# Patient Record
Sex: FEMALE | Race: WHITE | NOT HISPANIC OR LATINO | Employment: FULL TIME | URBAN - METROPOLITAN AREA
[De-identification: names, ages, dates, MRNs, and addresses within clinical notes are randomized per-mention and may not be internally consistent; named-entity substitution may affect disease eponyms.]

---

## 2023-02-27 LAB
ALANINE AMINOTRANSFERASE (SGPT) (U/L) IN SER/PLAS: 18 U/L (ref 7–45)
ALBUMIN (G/DL) IN SER/PLAS: 4.5 G/DL (ref 3.4–5)
ALKALINE PHOSPHATASE (U/L) IN SER/PLAS: 68 U/L (ref 33–110)
ANION GAP IN SER/PLAS: 15 MMOL/L (ref 10–20)
ASPARTATE AMINOTRANSFERASE (SGOT) (U/L) IN SER/PLAS: 16 U/L (ref 9–39)
BASOPHILS (10*3/UL) IN BLOOD BY AUTOMATED COUNT: 0.04 X10E9/L (ref 0–0.1)
BASOPHILS/100 LEUKOCYTES IN BLOOD BY AUTOMATED COUNT: 0.3 % (ref 0–2)
BILIRUBIN TOTAL (MG/DL) IN SER/PLAS: 0.5 MG/DL (ref 0–1.2)
CALCIUM (MG/DL) IN SER/PLAS: 9.7 MG/DL (ref 8.6–10.6)
CARBON DIOXIDE, TOTAL (MMOL/L) IN SER/PLAS: 24 MMOL/L (ref 21–32)
CHLORIDE (MMOL/L) IN SER/PLAS: 106 MMOL/L (ref 98–107)
CREATININE (MG/DL) IN SER/PLAS: 0.63 MG/DL (ref 0.5–1.05)
EOSINOPHILS (10*3/UL) IN BLOOD BY AUTOMATED COUNT: 0.1 X10E9/L (ref 0–0.7)
EOSINOPHILS/100 LEUKOCYTES IN BLOOD BY AUTOMATED COUNT: 0.7 % (ref 0–6)
ERYTHROCYTE DISTRIBUTION WIDTH (RATIO) BY AUTOMATED COUNT: 12.7 % (ref 11.5–14.5)
ERYTHROCYTE MEAN CORPUSCULAR HEMOGLOBIN CONCENTRATION (G/DL) BY AUTOMATED: 33.7 G/DL (ref 32–36)
ERYTHROCYTE MEAN CORPUSCULAR VOLUME (FL) BY AUTOMATED COUNT: 87 FL (ref 80–100)
ERYTHROCYTES (10*6/UL) IN BLOOD BY AUTOMATED COUNT: 4.95 X10E12/L (ref 4–5.2)
GFR FEMALE: >90 ML/MIN/1.73M2
GLUCOSE (MG/DL) IN SER/PLAS: 62 MG/DL (ref 74–99)
HEMATOCRIT (%) IN BLOOD BY AUTOMATED COUNT: 43 % (ref 36–46)
HEMOGLOBIN (G/DL) IN BLOOD: 14.5 G/DL (ref 12–16)
IMMATURE GRANULOCYTES/100 LEUKOCYTES IN BLOOD BY AUTOMATED COUNT: 0.5 % (ref 0–0.9)
LEUKOCYTES (10*3/UL) IN BLOOD BY AUTOMATED COUNT: 14.9 X10E9/L (ref 4.4–11.3)
LYMPHOCYTES (10*3/UL) IN BLOOD BY AUTOMATED COUNT: 2.25 X10E9/L (ref 1.2–4.8)
LYMPHOCYTES/100 LEUKOCYTES IN BLOOD BY AUTOMATED COUNT: 15.2 % (ref 13–44)
MONOCYTES (10*3/UL) IN BLOOD BY AUTOMATED COUNT: 0.68 X10E9/L (ref 0.1–1)
MONOCYTES/100 LEUKOCYTES IN BLOOD BY AUTOMATED COUNT: 4.6 % (ref 2–10)
NEUTROPHILS (10*3/UL) IN BLOOD BY AUTOMATED COUNT: 11.71 X10E9/L (ref 1.2–7.7)
NEUTROPHILS/100 LEUKOCYTES IN BLOOD BY AUTOMATED COUNT: 78.7 % (ref 40–80)
NRBC (PER 100 WBCS) BY AUTOMATED COUNT: 0 /100 WBC (ref 0–0)
PLATELETS (10*3/UL) IN BLOOD AUTOMATED COUNT: 312 X10E9/L (ref 150–450)
POTASSIUM (MMOL/L) IN SER/PLAS: 4.1 MMOL/L (ref 3.5–5.3)
PROTEIN TOTAL: 7 G/DL (ref 6.4–8.2)
SODIUM (MMOL/L) IN SER/PLAS: 141 MMOL/L (ref 136–145)
UREA NITROGEN (MG/DL) IN SER/PLAS: 13 MG/DL (ref 6–23)

## 2023-03-05 LAB — URINE CULTURE: NORMAL

## 2023-03-06 DIAGNOSIS — N39.0 URINARY TRACT INFECTION WITHOUT HEMATURIA, SITE UNSPECIFIED: Primary | ICD-10-CM

## 2023-03-08 LAB — URINE CULTURE: NORMAL

## 2023-05-05 ENCOUNTER — TELEPHONE (OUTPATIENT)
Dept: PRIMARY CARE | Facility: CLINIC | Age: 43
End: 2023-05-05
Payer: COMMERCIAL

## 2023-05-05 NOTE — TELEPHONE ENCOUNTER
Pt called & talked me thru what she needed her paperwork to say. Completed & will make a copy & give original to pt.

## 2023-06-14 ENCOUNTER — TELEPHONE (OUTPATIENT)
Dept: PRIMARY CARE | Facility: CLINIC | Age: 43
End: 2023-06-14
Payer: COMMERCIAL

## 2023-07-13 ENCOUNTER — OFFICE VISIT (OUTPATIENT)
Dept: PRIMARY CARE | Facility: CLINIC | Age: 43
End: 2023-07-13
Payer: COMMERCIAL

## 2023-07-13 ENCOUNTER — LAB (OUTPATIENT)
Dept: LAB | Facility: LAB | Age: 43
End: 2023-07-13
Payer: COMMERCIAL

## 2023-07-13 VITALS
HEART RATE: 84 BPM | DIASTOLIC BLOOD PRESSURE: 98 MMHG | BODY MASS INDEX: 38.94 KG/M2 | SYSTOLIC BLOOD PRESSURE: 142 MMHG | WEIGHT: 234 LBS

## 2023-07-13 DIAGNOSIS — E66.01 CLASS 2 SEVERE OBESITY DUE TO EXCESS CALORIES WITH SERIOUS COMORBIDITY AND BODY MASS INDEX (BMI) OF 38.0 TO 38.9 IN ADULT (MULTI): ICD-10-CM

## 2023-07-13 DIAGNOSIS — N39.0 URINARY TRACT INFECTION WITHOUT HEMATURIA, SITE UNSPECIFIED: ICD-10-CM

## 2023-07-13 DIAGNOSIS — K63.5 POLYP OF COLON, UNSPECIFIED PART OF COLON, UNSPECIFIED TYPE: ICD-10-CM

## 2023-07-13 DIAGNOSIS — I10 HTN (HYPERTENSION), BENIGN: ICD-10-CM

## 2023-07-13 DIAGNOSIS — N39.46 MIXED STRESS AND URGE URINARY INCONTINENCE: Primary | ICD-10-CM

## 2023-07-13 DIAGNOSIS — Z12.11 ENCOUNTER FOR SCREENING FOR COLORECTAL MALIGNANT NEOPLASM: ICD-10-CM

## 2023-07-13 DIAGNOSIS — L50.9 HIVES: ICD-10-CM

## 2023-07-13 DIAGNOSIS — M54.12 CERVICAL RADICULAR PAIN: ICD-10-CM

## 2023-07-13 DIAGNOSIS — Z00.00 ENCOUNTER FOR ANNUAL GENERAL MEDICAL EXAMINATION WITHOUT ABNORMAL FINDINGS IN ADULT: ICD-10-CM

## 2023-07-13 DIAGNOSIS — L57.0 KERATOSIS: ICD-10-CM

## 2023-07-13 DIAGNOSIS — Z12.12 ENCOUNTER FOR SCREENING FOR COLORECTAL MALIGNANT NEOPLASM: ICD-10-CM

## 2023-07-13 DIAGNOSIS — L73.9 FOLLICULITIS: ICD-10-CM

## 2023-07-13 DIAGNOSIS — Z12.31 ENCOUNTER FOR SCREENING MAMMOGRAM FOR MALIGNANT NEOPLASM OF BREAST: ICD-10-CM

## 2023-07-13 PROBLEM — Z98.890 HISTORY OF CONE BIOPSY OF CERVIX: Status: RESOLVED | Noted: 2019-02-08 | Resolved: 2023-07-13

## 2023-07-13 PROBLEM — E55.9 VITAMIN D DEFICIENCY: Status: ACTIVE | Noted: 2023-07-13

## 2023-07-13 PROBLEM — R29.818 SUSPECTED SLEEP APNEA: Status: RESOLVED | Noted: 2023-07-13 | Resolved: 2023-07-13

## 2023-07-13 PROBLEM — K21.9 GASTROESOPHAGEAL REFLUX DISEASE: Status: ACTIVE | Noted: 2023-07-13

## 2023-07-13 PROBLEM — J45.909 AB (ASTHMATIC BRONCHITIS) (HHS-HCC): Status: ACTIVE | Noted: 2023-07-13

## 2023-07-13 PROBLEM — G47.00 INSOMNIA: Status: ACTIVE | Noted: 2023-07-13

## 2023-07-13 PROBLEM — F41.1 GENERALIZED ANXIETY DISORDER: Status: ACTIVE | Noted: 2023-07-13

## 2023-07-13 PROCEDURE — 3080F DIAST BP >= 90 MM HG: CPT | Performed by: FAMILY MEDICINE

## 2023-07-13 PROCEDURE — 87086 URINE CULTURE/COLONY COUNT: CPT

## 2023-07-13 PROCEDURE — 3008F BODY MASS INDEX DOCD: CPT | Performed by: FAMILY MEDICINE

## 2023-07-13 PROCEDURE — 99215 OFFICE O/P EST HI 40 MIN: CPT | Performed by: FAMILY MEDICINE

## 2023-07-13 PROCEDURE — 3077F SYST BP >= 140 MM HG: CPT | Performed by: FAMILY MEDICINE

## 2023-07-13 PROCEDURE — 1036F TOBACCO NON-USER: CPT | Performed by: FAMILY MEDICINE

## 2023-07-13 RX ORDER — PREDNISOLONE ACETATE 10 MG/ML
SUSPENSION/ DROPS OPHTHALMIC
COMMUNITY
Start: 2022-12-10 | End: 2023-10-16 | Stop reason: ALTCHOICE

## 2023-07-13 RX ORDER — LEVONORGESTREL 52 MG/1
INTRAUTERINE DEVICE INTRAUTERINE
COMMUNITY
Start: 2021-02-08

## 2023-07-13 RX ORDER — LORATADINE 10 MG/1
1 TABLET ORAL DAILY PRN
COMMUNITY
Start: 2021-11-19 | End: 2023-10-16 | Stop reason: ALTCHOICE

## 2023-07-13 RX ORDER — UREA 40 %
CREAM (GRAM) TOPICAL
COMMUNITY
Start: 2023-03-06 | End: 2023-10-16 | Stop reason: ALTCHOICE

## 2023-07-13 RX ORDER — ATENOLOL 25 MG/1
25 TABLET ORAL DAILY
Qty: 90 TABLET | Refills: 0 | Status: SHIPPED | OUTPATIENT
Start: 2023-07-13 | End: 2023-10-09 | Stop reason: SDUPTHER

## 2023-07-13 RX ORDER — CLOTRIMAZOLE 1 %
CREAM (GRAM) TOPICAL 2 TIMES DAILY
COMMUNITY
Start: 2022-07-20 | End: 2023-10-16 | Stop reason: ALTCHOICE

## 2023-07-13 RX ORDER — ESCITALOPRAM OXALATE 5 MG/1
1 TABLET ORAL DAILY
COMMUNITY
Start: 2022-09-23 | End: 2023-10-16 | Stop reason: ALTCHOICE

## 2023-07-13 RX ORDER — MONTELUKAST SODIUM 10 MG/1
1 TABLET ORAL NIGHTLY
COMMUNITY
Start: 2020-03-20 | End: 2023-10-16 | Stop reason: ALTCHOICE

## 2023-07-13 RX ORDER — ALBUTEROL SULFATE 90 UG/1
2 AEROSOL, METERED RESPIRATORY (INHALATION) EVERY 4 HOURS PRN
COMMUNITY
Start: 2020-03-20 | End: 2023-10-16 | Stop reason: ALTCHOICE

## 2023-07-13 RX ORDER — GABAPENTIN 100 MG/1
100 CAPSULE ORAL NIGHTLY
Qty: 90 CAPSULE | Refills: 0 | Status: SHIPPED | OUTPATIENT
Start: 2023-07-13 | End: 2023-10-16 | Stop reason: ALTCHOICE

## 2023-07-13 RX ORDER — TRIAMCINOLONE ACETONIDE 5 MG/G
CREAM TOPICAL
COMMUNITY
Start: 2020-08-20 | End: 2023-10-16 | Stop reason: ALTCHOICE

## 2023-07-13 RX ORDER — TRAZODONE HYDROCHLORIDE 50 MG/1
50 TABLET ORAL NIGHTLY PRN
COMMUNITY
End: 2023-10-16

## 2023-07-13 RX ORDER — RUXOLITINIB 15 MG/G
CREAM TOPICAL
COMMUNITY
Start: 2023-05-30 | End: 2023-10-16 | Stop reason: ALTCHOICE

## 2023-07-13 NOTE — PROGRESS NOTES
Subjective   Patient ID: Deja Hazel is a 43 y.o. female who presents for Follow-up.  Today she is accompanied by alone.     HPI  Needs forms filled for standing and sitting accommodations due to pain in lower back and numbness of both legs.   She  would like to be able to sit down at work when she is at work.  Needs lumbar support pillow and mini Theragun rx  Would like the vaginal dilators and pelvic floor massage tool    Has a lot of stress and increased pain      Review of Systems   Constitutional:  Negative for activity change, appetite change, chills, diaphoresis, fatigue, fever and unexpected weight change.   HENT:  Negative for congestion, dental problem, drooling, ear discharge, ear pain, facial swelling, hearing loss, nosebleeds, postnasal drip, rhinorrhea, sinus pressure, sinus pain, sneezing, sore throat, tinnitus, trouble swallowing and voice change.    Eyes:  Negative for pain, discharge, redness, itching and visual disturbance.   Respiratory:  Negative for cough, chest tightness, shortness of breath and wheezing.    Cardiovascular:  Negative for chest pain, palpitations and leg swelling.   Gastrointestinal:  Negative for abdominal pain, blood in stool, constipation, diarrhea, nausea and vomiting.   Endocrine: Negative for cold intolerance, heat intolerance, polydipsia, polyphagia and polyuria.   Genitourinary:  Negative for decreased urine volume, dysuria, flank pain, frequency, hematuria and urgency.   Musculoskeletal:  Negative for arthralgias, back pain, gait problem, joint swelling, myalgias and neck stiffness.   Skin:  Negative for color change, pallor, rash and wound.   Neurological:  Negative for dizziness.   Hematological:  Negative for adenopathy. Does not bruise/bleed easily.   Psychiatric/Behavioral:  Negative for agitation, behavioral problems and sleep disturbance. The patient is not nervous/anxious.        Objective   Blood Pressure (Abnormal) 142/98 (BP Location: Left arm)   Pulse  84   Weight 106 kg (234 lb)   Body Mass Index 38.94 kg/m²   BSA: 2.2 meters squared  Growth percentiles: Facility age limit for growth %nandini is 20 years. Facility age limit for growth %nandini is 20 years.     Physical Exam  Vitals and nursing note reviewed.   Constitutional:       General: She is not in acute distress.     Appearance: Normal appearance. She is normal weight. She is not ill-appearing.   HENT:      Head: Normocephalic.      Right Ear: Tympanic membrane, ear canal and external ear normal.      Left Ear: Tympanic membrane, ear canal and external ear normal.      Nose: Nose normal. No rhinorrhea.      Mouth/Throat:      Mouth: Mucous membranes are moist.      Pharynx: Oropharynx is clear.   Eyes:      Extraocular Movements: Extraocular movements intact.      Conjunctiva/sclera: Conjunctivae normal.      Pupils: Pupils are equal, round, and reactive to light.   Cardiovascular:      Rate and Rhythm: Normal rate and regular rhythm.      Pulses: Normal pulses.      Heart sounds: Normal heart sounds.   Pulmonary:      Effort: Pulmonary effort is normal. No respiratory distress.      Breath sounds: Normal breath sounds. No wheezing.   Abdominal:      General: Abdomen is flat. Bowel sounds are normal.      Palpations: Abdomen is soft.      Tenderness: There is no abdominal tenderness. There is no guarding or rebound.      Hernia: No hernia is present.   Musculoskeletal:         General: Normal range of motion.      Cervical back: Normal range of motion and neck supple. No rigidity or tenderness.      Right lower leg: No edema.      Left lower leg: No edema.   Lymphadenopathy:      Cervical: No cervical adenopathy.   Skin:     General: Skin is warm and dry.      Capillary Refill: Capillary refill takes more than 3 seconds.   Neurological:      General: No focal deficit present.      Mental Status: She is alert and oriented to person, place, and time.      Sensory: No sensory deficit.      Motor: No weakness.       Coordination: Coordination normal.   Psychiatric:         Mood and Affect: Mood normal.         Behavior: Behavior normal.         Thought Content: Thought content normal.         Judgment: Judgment normal.         Assessment/Plan   Problem List Items Addressed This Visit    None  Visit Diagnoses       Mixed stress and urge urinary incontinence    -  Primary    Relevant Orders    TSH with reflex to Free T4 if abnormal (Completed)    Cervical radicular pain        Relevant Medications    gabapentin (Neurontin) 100 mg capsule    Other Relevant Orders    TSH with reflex to Free T4 if abnormal (Completed)    Keratosis        Relevant Orders    TSH with reflex to Free T4 if abnormal (Completed)    Folliculitis        Relevant Orders    C-Reactive Protein (Completed)    TSH with reflex to Free T4 if abnormal (Completed)    Encounter for annual general medical examination without abnormal findings in adult        Relevant Orders    Comprehensive Metabolic Panel (Completed)    CBC and Auto Differential (Completed)    HIV 1/2 Antigen/Antibody Screen with Reflex to Confirmation (Completed)    Hepatitis C Antibody (Completed)    Encounter for screening mammogram for malignant neoplasm of breast        Relevant Orders    BI mammo bilateral screening tomosynthesis (Completed)    Encounter for screening for colorectal malignant neoplasm        Relevant Orders    POCT Fecal occult immunoassay-ifob manually resulted    Class 2 severe obesity due to excess calories with serious comorbidity and body mass index (BMI) of 38.0 to 38.9 in adult (CMS/[facility])        Relevant Orders    Insulin, Random (Completed)    Polyp of colon, unspecified part of colon, unspecified type        Relevant Orders    Referral to Gastroenterology    HTN (hypertension), benign        Relevant Medications    atenolol (Tenormin) 25 mg tablet    Hives        Relevant Orders    Hemoglobin A1C (Completed)        Will send in all the paperwork and orders that she  needs for her HSA to cover.   F/U for HTN for elevated BP

## 2023-07-14 ENCOUNTER — LAB (OUTPATIENT)
Dept: LAB | Facility: LAB | Age: 43
End: 2023-07-14
Payer: COMMERCIAL

## 2023-07-14 DIAGNOSIS — L50.9 HIVES: ICD-10-CM

## 2023-07-14 DIAGNOSIS — N39.46 MIXED STRESS AND URGE URINARY INCONTINENCE: ICD-10-CM

## 2023-07-14 DIAGNOSIS — L73.9 FOLLICULITIS: ICD-10-CM

## 2023-07-14 DIAGNOSIS — L57.0 KERATOSIS: ICD-10-CM

## 2023-07-14 DIAGNOSIS — M54.12 CERVICAL RADICULAR PAIN: ICD-10-CM

## 2023-07-14 DIAGNOSIS — E66.01 CLASS 2 SEVERE OBESITY DUE TO EXCESS CALORIES WITH SERIOUS COMORBIDITY AND BODY MASS INDEX (BMI) OF 38.0 TO 38.9 IN ADULT (MULTI): ICD-10-CM

## 2023-07-14 DIAGNOSIS — Z00.00 ENCOUNTER FOR ANNUAL GENERAL MEDICAL EXAMINATION WITHOUT ABNORMAL FINDINGS IN ADULT: ICD-10-CM

## 2023-07-14 LAB
ALANINE AMINOTRANSFERASE (SGPT) (U/L) IN SER/PLAS: 19 U/L (ref 7–45)
ALBUMIN (G/DL) IN SER/PLAS: 4.5 G/DL (ref 3.4–5)
ALKALINE PHOSPHATASE (U/L) IN SER/PLAS: 74 U/L (ref 33–110)
ANION GAP IN SER/PLAS: 11 MMOL/L (ref 10–20)
ASPARTATE AMINOTRANSFERASE (SGOT) (U/L) IN SER/PLAS: 14 U/L (ref 9–39)
BASOPHILS (10*3/UL) IN BLOOD BY AUTOMATED COUNT: 0.04 X10E9/L (ref 0–0.1)
BASOPHILS/100 LEUKOCYTES IN BLOOD BY AUTOMATED COUNT: 0.4 % (ref 0–2)
BILIRUBIN TOTAL (MG/DL) IN SER/PLAS: 0.6 MG/DL (ref 0–1.2)
C REACTIVE PROTEIN (MG/L) IN SER/PLAS: 1.06 MG/DL
CALCIUM (MG/DL) IN SER/PLAS: 9.3 MG/DL (ref 8.6–10.6)
CARBON DIOXIDE, TOTAL (MMOL/L) IN SER/PLAS: 26 MMOL/L (ref 21–32)
CHLORIDE (MMOL/L) IN SER/PLAS: 106 MMOL/L (ref 98–107)
CREATININE (MG/DL) IN SER/PLAS: 0.65 MG/DL (ref 0.5–1.05)
EOSINOPHILS (10*3/UL) IN BLOOD BY AUTOMATED COUNT: 0.18 X10E9/L (ref 0–0.7)
EOSINOPHILS/100 LEUKOCYTES IN BLOOD BY AUTOMATED COUNT: 1.8 % (ref 0–6)
ERYTHROCYTE DISTRIBUTION WIDTH (RATIO) BY AUTOMATED COUNT: 12.6 % (ref 11.5–14.5)
ERYTHROCYTE MEAN CORPUSCULAR HEMOGLOBIN CONCENTRATION (G/DL) BY AUTOMATED: 32.7 G/DL (ref 32–36)
ERYTHROCYTE MEAN CORPUSCULAR VOLUME (FL) BY AUTOMATED COUNT: 88 FL (ref 80–100)
ERYTHROCYTES (10*6/UL) IN BLOOD BY AUTOMATED COUNT: 5.23 X10E12/L (ref 4–5.2)
ESTIMATED AVERAGE GLUCOSE FOR HBA1C: 111 MG/DL
GFR FEMALE: >90 ML/MIN/1.73M2
GLUCOSE (MG/DL) IN SER/PLAS: 105 MG/DL (ref 74–99)
HEMATOCRIT (%) IN BLOOD BY AUTOMATED COUNT: 46.2 % (ref 36–46)
HEMOGLOBIN (G/DL) IN BLOOD: 15.1 G/DL (ref 12–16)
HEMOGLOBIN A1C/HEMOGLOBIN TOTAL IN BLOOD: 5.5 %
HEPATITIS C VIRUS AB PRESENCE IN SERUM: NONREACTIVE
HIV 1/ 2 AG/AB SCREEN: NONREACTIVE
IMMATURE GRANULOCYTES/100 LEUKOCYTES IN BLOOD BY AUTOMATED COUNT: 0.5 % (ref 0–0.9)
INSULIN: 19 UIU/ML (ref 3–25)
LEUKOCYTES (10*3/UL) IN BLOOD BY AUTOMATED COUNT: 10.2 X10E9/L (ref 4.4–11.3)
LYMPHOCYTES (10*3/UL) IN BLOOD BY AUTOMATED COUNT: 2.07 X10E9/L (ref 1.2–4.8)
LYMPHOCYTES/100 LEUKOCYTES IN BLOOD BY AUTOMATED COUNT: 20.3 % (ref 13–44)
MONOCYTES (10*3/UL) IN BLOOD BY AUTOMATED COUNT: 0.67 X10E9/L (ref 0.1–1)
MONOCYTES/100 LEUKOCYTES IN BLOOD BY AUTOMATED COUNT: 6.6 % (ref 2–10)
NEUTROPHILS (10*3/UL) IN BLOOD BY AUTOMATED COUNT: 7.2 X10E9/L (ref 1.2–7.7)
NEUTROPHILS/100 LEUKOCYTES IN BLOOD BY AUTOMATED COUNT: 70.4 % (ref 40–80)
NRBC (PER 100 WBCS) BY AUTOMATED COUNT: 0 /100 WBC (ref 0–0)
PLATELETS (10*3/UL) IN BLOOD AUTOMATED COUNT: 310 X10E9/L (ref 150–450)
POTASSIUM (MMOL/L) IN SER/PLAS: 4.2 MMOL/L (ref 3.5–5.3)
PROTEIN TOTAL: 7.1 G/DL (ref 6.4–8.2)
SODIUM (MMOL/L) IN SER/PLAS: 139 MMOL/L (ref 136–145)
THYROTROPIN (MIU/L) IN SER/PLAS BY DETECTION LIMIT <= 0.05 MIU/L: 1.91 MIU/L (ref 0.44–3.98)
UREA NITROGEN (MG/DL) IN SER/PLAS: 16 MG/DL (ref 6–23)

## 2023-07-14 PROCEDURE — 36415 COLL VENOUS BLD VENIPUNCTURE: CPT

## 2023-07-14 PROCEDURE — 84443 ASSAY THYROID STIM HORMONE: CPT

## 2023-07-14 PROCEDURE — 83036 HEMOGLOBIN GLYCOSYLATED A1C: CPT

## 2023-07-14 PROCEDURE — 86140 C-REACTIVE PROTEIN: CPT

## 2023-07-14 PROCEDURE — 86803 HEPATITIS C AB TEST: CPT

## 2023-07-14 PROCEDURE — 87389 HIV-1 AG W/HIV-1&-2 AB AG IA: CPT

## 2023-07-14 PROCEDURE — 85025 COMPLETE CBC W/AUTO DIFF WBC: CPT

## 2023-07-14 PROCEDURE — 83525 ASSAY OF INSULIN: CPT

## 2023-07-14 PROCEDURE — 80053 COMPREHEN METABOLIC PANEL: CPT

## 2023-07-15 LAB — URINE CULTURE: NORMAL

## 2023-07-19 ASSESSMENT — ENCOUNTER SYMPTOMS
CONSTIPATION: 0
EYE PAIN: 0
SINUS PRESSURE: 0
COLOR CHANGE: 0
ABDOMINAL PAIN: 0
TROUBLE SWALLOWING: 0
DIAPHORESIS: 0
SORE THROAT: 0
DYSURIA: 0
VOICE CHANGE: 0
POLYPHAGIA: 0
CHILLS: 0
AGITATION: 0
FREQUENCY: 0
JOINT SWELLING: 0
COUGH: 0
UNEXPECTED WEIGHT CHANGE: 0
FEVER: 0
ADENOPATHY: 0
CHEST TIGHTNESS: 0
BLOOD IN STOOL: 0
PALPITATIONS: 0
ACTIVITY CHANGE: 0
SHORTNESS OF BREATH: 0
FACIAL SWELLING: 0
DIZZINESS: 0
RHINORRHEA: 0
NECK STIFFNESS: 0
EYE DISCHARGE: 0
MYALGIAS: 0
FLANK PAIN: 0
APPETITE CHANGE: 0
SLEEP DISTURBANCE: 0
NERVOUS/ANXIOUS: 0
EYE REDNESS: 0
SINUS PAIN: 0
DIARRHEA: 0
NAUSEA: 0
ARTHRALGIAS: 0
WOUND: 0
WHEEZING: 0
FATIGUE: 0
HEMATURIA: 0
POLYDIPSIA: 0
BACK PAIN: 0
VOMITING: 0
BRUISES/BLEEDS EASILY: 0
EYE ITCHING: 0

## 2023-07-19 NOTE — PATIENT INSTRUCTIONS
F/U for HTN for elevated BP  Dear Ms. Hazel:     To help you more effectively manage your high blood pressure, we would like to remind you of the following preventive measures you can take at home:    1) Eat right: Your diet should be rich in fruits, vegetables, potassium, and low-fat dairy products. You should also reduce your intake of sodium and fats, particularly saturated fats.    2) Maintain a healthy weight: Try to achieve and maintain a healthy weight. If you are unsure what this means for you, please contact our clinic.    3) Exercise: Try to get at least 30 minutes of aerobic exercise every day.    4) Moderate your alcohol consumption: Limit your alcohol intake to one drink per day.    5) Monitor your blood pressure: You should check your blood pressure regularly. Notify our clinic if your blood pressure readings are consistently higher than recommended.    We have included a personalized hypertension report card on the following page that lists your most recent blood pressure readings and lab results, along with your ideal values. If you have any questions or concerns about these instructions, your results, or your improving health, please don't hesitate to call.    Thank you for including us as members of your health care team.    [unfilled]    Sincerely,         Jamila Escobar MD    Enclosure      Hypertension Report Card for Deja Hazel  July 19, 2023:     Below is a summary of recent tests related to your hypertension that can help you manage your health.     Blood Pressure:   Normal blood pressure values are 120/80 or lower. Your blood pressure values should be less than 140/90. If your readings at home are consistently higher than this, please contact me.     Your most recent blood pressure readings at our clinic were:   BP Readings from Last 3 Encounters:   07/13/23 (Abnormal) 142/98   05/30/23 146/90   04/10/23 (Abnormal) 130/96       Creatinine:   High blood pressure can cause a loss of  kidney function. Creatinine is a measure of this kidney function.      Your most recent creatinine levels were:   Creatinine (mg/dL)   Date Value   07/14/2023 0.65   02/27/2023 0.63   01/27/2023 0.63           Potassium:  Potassium is an electrolyte in your blood that can be affected by blood pressure treatment.     Your most recent potassium levels were:  Potassium (mmol/L)   Date Value   07/14/2023 4.2   02/27/2023 4.1   01/27/2023 3.7

## 2023-07-20 DIAGNOSIS — R32 URINARY INCONTINENCE, UNSPECIFIED TYPE: ICD-10-CM

## 2023-07-20 DIAGNOSIS — M54.12 CERVICAL RADICULAR PAIN: ICD-10-CM

## 2023-07-20 DIAGNOSIS — M54.50 LUMBAR BACK PAIN: ICD-10-CM

## 2023-07-20 DIAGNOSIS — M54.2 CERVICAL PAIN (NECK): ICD-10-CM

## 2023-07-25 ENCOUNTER — TELEPHONE (OUTPATIENT)
Dept: PRIMARY CARE | Facility: CLINIC | Age: 43
End: 2023-07-25
Payer: COMMERCIAL

## 2023-07-25 DIAGNOSIS — N39.46 MIXED STRESS AND URGE URINARY INCONTINENCE: ICD-10-CM

## 2023-07-25 NOTE — TELEPHONE ENCOUNTER
Pt came up from the lab to get her letter of medical necessity; however, she said there was no order for the vaginal dilator set. I will ask Dr Escobar about it & then get pt's forms completed & take them down to her at the lab (in an envelope)    said that was fine to order for pt

## 2023-08-17 PROBLEM — M54.12 RIGHT CERVICAL RADICULOPATHY: Status: ACTIVE | Noted: 2023-08-17

## 2023-08-17 PROBLEM — R11.2 MILD NAUSEA AND VOMITING: Status: ACTIVE | Noted: 2022-08-02

## 2023-08-17 PROBLEM — K80.20 CHOLELITHIASIS: Status: ACTIVE | Noted: 2023-08-17

## 2023-08-17 PROBLEM — D25.9 FIBROID, UTERINE: Status: ACTIVE | Noted: 2023-08-17

## 2023-08-17 PROBLEM — J45.909 ASTHMA (HHS-HCC): Status: ACTIVE | Noted: 2023-08-17

## 2023-08-17 PROBLEM — R63.0 LOSS OF APPETITE: Status: ACTIVE | Noted: 2023-08-17

## 2023-08-17 PROBLEM — N39.3 SUI (STRESS URINARY INCONTINENCE, FEMALE): Status: ACTIVE | Noted: 2023-08-17

## 2023-08-17 PROBLEM — R79.0 ABNORMAL IRON SATURATION: Status: ACTIVE | Noted: 2023-08-17

## 2023-08-17 PROBLEM — R10.30 GROIN PAIN: Status: ACTIVE | Noted: 2023-08-17

## 2023-08-17 PROBLEM — M54.50 LOW BACK PAIN: Status: ACTIVE | Noted: 2023-08-17

## 2023-08-17 PROBLEM — N83.8 OVARIAN MASS: Status: ACTIVE | Noted: 2023-08-17

## 2023-08-17 PROBLEM — L30.9 ECZEMA: Status: ACTIVE | Noted: 2023-08-17

## 2023-08-17 PROBLEM — L40.9 PSORIASIS: Status: ACTIVE | Noted: 2023-08-17

## 2023-08-17 PROBLEM — R31.9 HEMATURIA: Status: ACTIVE | Noted: 2023-08-17

## 2023-08-17 PROBLEM — E66.812 CLASS 2 SEVERE OBESITY WITH SERIOUS COMORBIDITY AND BODY MASS INDEX (BMI) OF 37.0 TO 37.9 IN ADULT: Status: ACTIVE | Noted: 2023-08-17

## 2023-08-17 PROBLEM — K90.49 FOOD INTOLERANCE: Status: ACTIVE | Noted: 2023-08-17

## 2023-08-17 PROBLEM — G47.9 RESTLESS SLEEPER: Status: ACTIVE | Noted: 2023-08-17

## 2023-08-17 PROBLEM — R10.9 RT FLANK PAIN: Status: ACTIVE | Noted: 2023-08-17

## 2023-08-17 PROBLEM — N93.9 ABNORMAL UTERINE BLEEDING (AUB): Status: ACTIVE | Noted: 2023-08-17

## 2023-08-17 PROBLEM — Q44.6 LIVER, POLYCYSTIC: Status: ACTIVE | Noted: 2023-08-17

## 2023-08-17 PROBLEM — R79.89 ABNORMAL CBC: Status: ACTIVE | Noted: 2023-08-17

## 2023-08-17 PROBLEM — E66.01 CLASS 2 SEVERE OBESITY WITH SERIOUS COMORBIDITY AND BODY MASS INDEX (BMI) OF 37.0 TO 37.9 IN ADULT (MULTI): Status: ACTIVE | Noted: 2023-08-17

## 2023-08-17 PROBLEM — R73.09 ABNORMAL GLUCOSE LEVEL: Status: ACTIVE | Noted: 2023-08-17

## 2023-08-17 LAB — SARS-COV-2 RESULT: DETECTED

## 2023-08-17 RX ORDER — ONDANSETRON HYDROCHLORIDE 8 MG/1
1 TABLET, FILM COATED ORAL EVERY 8 HOURS PRN
COMMUNITY
Start: 2022-08-02 | End: 2023-10-16 | Stop reason: ALTCHOICE

## 2023-08-17 RX ORDER — FAMOTIDINE 40 MG/1
1 TABLET, FILM COATED ORAL 2 TIMES DAILY
COMMUNITY
End: 2023-10-16 | Stop reason: ALTCHOICE

## 2023-08-17 RX ORDER — ALBUTEROL SULFATE 90 UG/1
1-2 AEROSOL, METERED RESPIRATORY (INHALATION)
COMMUNITY
End: 2023-10-16 | Stop reason: SDUPTHER

## 2023-08-25 PROBLEM — B35.3 TINEA PEDIS: Status: ACTIVE | Noted: 2023-03-06

## 2023-08-25 PROBLEM — L85.8 OTHER SPECIFIED EPIDERMAL THICKENING: Status: ACTIVE | Noted: 2023-03-06

## 2023-08-25 PROBLEM — L30.0 NUMMULAR DERMATITIS: Status: ACTIVE | Noted: 2023-03-06

## 2023-08-25 PROBLEM — L84 CALLUS: Status: ACTIVE | Noted: 2023-03-06

## 2023-08-25 PROBLEM — L72.3 SEBACEOUS CYST: Status: ACTIVE | Noted: 2023-03-06

## 2023-08-25 RX ORDER — KETOCONAZOLE 20 MG/G
1 CREAM TOPICAL
COMMUNITY
Start: 2023-03-06 | End: 2023-12-19 | Stop reason: ALTCHOICE

## 2023-09-14 ENCOUNTER — TELEPHONE (OUTPATIENT)
Dept: PRIMARY CARE | Facility: CLINIC | Age: 43
End: 2023-09-14
Payer: COMMERCIAL
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
## 2023-10-04 ENCOUNTER — TREATMENT (OUTPATIENT)
Dept: PHYSICAL THERAPY | Facility: CLINIC | Age: 43
End: 2023-10-04
Payer: COMMERCIAL

## 2023-10-04 DIAGNOSIS — M54.12 RIGHT CERVICAL RADICULOPATHY: ICD-10-CM

## 2023-10-04 DIAGNOSIS — M54.50 LOW BACK PAIN: Primary | ICD-10-CM

## 2023-10-04 PROCEDURE — 97110 THERAPEUTIC EXERCISES: CPT | Mod: GP,CQ

## 2023-10-04 ASSESSMENT — PAIN SCALES - GENERAL: PAINLEVEL_OUTOF10: 5 - MODERATE PAIN

## 2023-10-04 ASSESSMENT — PAIN - FUNCTIONAL ASSESSMENT: PAIN_FUNCTIONAL_ASSESSMENT: 0-10

## 2023-10-04 NOTE — PROGRESS NOTES
"Physical Therapy    Physical Therapy Treatment    Patient Name: Deja Hazel  MRN: [id number]  Today's Date: 10/4/2023  Time Calculation  Start Time: 1655  Stop Time: 1740  Time Calculation (min): 45 min      Insurance reviewed   Visit number: 16  Approved number of visits: 30   Authorization not required after evaluation   $25 copay   30V PCY    Assessment:   Patient presented to session with new instances of B UE/LE numbness and tingling when waking. She had intermittent irritability in the LS during stretching of the hip flexors. LTR increased radicular symptoms in the L quad to the knee and the R groin. Visible core and quad quivering during bridges.    Plan:   manual soft tissue work as appropriate  continue to encourage active movement.     Current Problem  1. Low back pain        2. Right cervical radiculopathy            Subjective   Patient states she has been having increased numbness and tingling in the B UEs and B LEs when waking up in the morning.     Precautions  Precautions  Precautions Comment: no fall risk    Pain  Pain Assessment: 0-10  Pain Score: 5 - Moderate pain  Pain Location:  (low back, B shoulders, R groin)    Objective   N/A    Treatments:  Therapeutic exercise (32657): timed minutes 45, units 3 .   scifit L2 manual 6 minutes  dynamic: marching, butt kick, posterior walking, [place]  1/2 kneeling hip flexor stretch 10\"x5 R/L  LTR 10X  bridge 10x   supine hip flexor stretch 60\" R/L  3 way hip stretch with strap 30\" each R/L  Lat stretch x 30\"  Kendal pose x 30\"    updated HEP:  Access Code: [email or web address]  URL: [email or web address]  Date: 09/25/2023  Prepared by: Susana Guerra    Exercises  - Walking March - 2 x daily - 7 x weekly - 1 sets - 10 reps  - Walking Butt Kicks - 2 x daily - 7 x weekly - 1 sets - 10 reps  - Braided Sidestepping - 2 x daily - 7 x weekly - 2 sets - 10 reps  - Modified Lamin Stretch - 2 x daily - 7 x weekly - 1 sets - 3 reps - 30 hold  - Half " Kneeling Hip Flexor Stretch - 2 x daily - 7 x weekly - 2 sets - 10 reps  - Half Kneeling Hip Flexor Stretch with Tri-Planar Reach - 2 x daily - 7 x weekly - 1 sets - 10 reps - 10 hold  - Supine Lower Trunk Rotation - 2 x daily - 7 x weekly - 2 sets - 10 reps - 5 hold  - Supine Bridge - 2 x daily - 7 x weekly - 2 sets - 10 reps - 5 hold  - Supine ITB Stretch with Strap - 2 x daily - 7 x weekly - 2 sets - 10 reps      OP EDUCATION:   Exercise technique, postural awareness

## 2023-10-08 DIAGNOSIS — I10 HTN (HYPERTENSION), BENIGN: ICD-10-CM

## 2023-10-09 ENCOUNTER — TREATMENT (OUTPATIENT)
Dept: PHYSICAL THERAPY | Facility: CLINIC | Age: 43
End: 2023-10-09
Payer: COMMERCIAL

## 2023-10-09 DIAGNOSIS — M54.12 RIGHT CERVICAL RADICULOPATHY: ICD-10-CM

## 2023-10-09 DIAGNOSIS — M54.50 LOW BACK PAIN: Primary | ICD-10-CM

## 2023-10-09 PROCEDURE — 97140 MANUAL THERAPY 1/> REGIONS: CPT | Mod: GP | Performed by: PHYSICAL THERAPIST

## 2023-10-09 PROCEDURE — 97110 THERAPEUTIC EXERCISES: CPT | Mod: GP | Performed by: PHYSICAL THERAPIST

## 2023-10-09 RX ORDER — ATENOLOL 25 MG/1
25 TABLET ORAL DAILY
Qty: 90 TABLET | Refills: 0 | Status: SHIPPED | OUTPATIENT
Start: 2023-10-09 | End: 2023-10-16 | Stop reason: SDUPTHER

## 2023-10-09 ASSESSMENT — ENCOUNTER SYMPTOMS
DEPRESSION: 0
OCCASIONAL FEELINGS OF UNSTEADINESS: 0
LOSS OF SENSATION IN FEET: 0

## 2023-10-09 ASSESSMENT — PATIENT HEALTH QUESTIONNAIRE - PHQ9
2. FEELING DOWN, DEPRESSED OR HOPELESS: NOT AT ALL
1. LITTLE INTEREST OR PLEASURE IN DOING THINGS: NOT AT ALL
SUM OF ALL RESPONSES TO PHQ9 QUESTIONS 1 AND 2: 0

## 2023-10-09 ASSESSMENT — PAIN - FUNCTIONAL ASSESSMENT: PAIN_FUNCTIONAL_ASSESSMENT: 0-10

## 2023-10-09 ASSESSMENT — PAIN SCALES - GENERAL: PAINLEVEL_OUTOF10: 5 - MODERATE PAIN
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
## 2023-10-11 ENCOUNTER — PHARMACY VISIT (OUTPATIENT)
Dept: PHARMACY | Facility: CLINIC | Age: 43
End: 2023-10-11
Payer: COMMERCIAL

## 2023-10-13 ENCOUNTER — PHARMACY VISIT (OUTPATIENT)
Dept: PHARMACY | Facility: CLINIC | Age: 43
End: 2023-10-13
Payer: COMMERCIAL

## 2023-10-16 ENCOUNTER — OFFICE VISIT (OUTPATIENT)
Dept: PRIMARY CARE | Facility: CLINIC | Age: 43
End: 2023-10-16
Payer: COMMERCIAL

## 2023-10-16 ENCOUNTER — PHARMACY VISIT (OUTPATIENT)
Dept: PHARMACY | Facility: CLINIC | Age: 43
End: 2023-10-16
Payer: COMMERCIAL

## 2023-10-16 ENCOUNTER — TREATMENT (OUTPATIENT)
Dept: PHYSICAL THERAPY | Facility: CLINIC | Age: 43
End: 2023-10-16
Payer: COMMERCIAL

## 2023-10-16 VITALS
SYSTOLIC BLOOD PRESSURE: 126 MMHG | OXYGEN SATURATION: 96 % | TEMPERATURE: 98.1 F | HEART RATE: 64 BPM | HEIGHT: 65 IN | RESPIRATION RATE: 16 BRPM | WEIGHT: 234 LBS | DIASTOLIC BLOOD PRESSURE: 83 MMHG | BODY MASS INDEX: 38.99 KG/M2

## 2023-10-16 DIAGNOSIS — E66.01 CLASS 2 SEVERE OBESITY DUE TO EXCESS CALORIES WITH SERIOUS COMORBIDITY AND BODY MASS INDEX (BMI) OF 38.0 TO 38.9 IN ADULT (MULTI): Chronic | ICD-10-CM

## 2023-10-16 DIAGNOSIS — J45.20 MILD INTERMITTENT ASTHMA WITHOUT COMPLICATION (HHS-HCC): ICD-10-CM

## 2023-10-16 DIAGNOSIS — L57.0 KERATOSIS: ICD-10-CM

## 2023-10-16 DIAGNOSIS — M54.12 CERVICAL RADICULAR PAIN: ICD-10-CM

## 2023-10-16 DIAGNOSIS — G89.29 CHRONIC BILATERAL LOW BACK PAIN WITH RIGHT-SIDED SCIATICA: ICD-10-CM

## 2023-10-16 DIAGNOSIS — N39.46 MIXED STRESS AND URGE URINARY INCONTINENCE: ICD-10-CM

## 2023-10-16 DIAGNOSIS — M54.50 LOW BACK PAIN: Primary | ICD-10-CM

## 2023-10-16 DIAGNOSIS — I10 HTN (HYPERTENSION), BENIGN: Primary | ICD-10-CM

## 2023-10-16 DIAGNOSIS — M54.41 CHRONIC BILATERAL LOW BACK PAIN WITH RIGHT-SIDED SCIATICA: ICD-10-CM

## 2023-10-16 DIAGNOSIS — M54.12 RIGHT CERVICAL RADICULOPATHY: ICD-10-CM

## 2023-10-16 PROBLEM — R10.9 RT FLANK PAIN: Status: RESOLVED | Noted: 2023-08-17 | Resolved: 2023-10-16

## 2023-10-16 PROBLEM — N39.3 SUI (STRESS URINARY INCONTINENCE, FEMALE): Status: RESOLVED | Noted: 2023-08-17 | Resolved: 2023-10-16

## 2023-10-16 PROBLEM — G47.9 RESTLESS SLEEPER: Status: RESOLVED | Noted: 2023-08-17 | Resolved: 2023-10-16

## 2023-10-16 PROBLEM — B35.3 TINEA PEDIS: Status: RESOLVED | Noted: 2023-03-06 | Resolved: 2023-10-16

## 2023-10-16 PROBLEM — N83.8 OVARIAN MASS: Status: RESOLVED | Noted: 2023-08-17 | Resolved: 2023-10-16

## 2023-10-16 PROBLEM — J45.909 AB (ASTHMATIC BRONCHITIS) (HHS-HCC): Status: RESOLVED | Noted: 2023-07-13 | Resolved: 2023-10-16

## 2023-10-16 PROBLEM — L40.9 PSORIASIS: Status: RESOLVED | Noted: 2023-08-17 | Resolved: 2023-10-16

## 2023-10-16 PROBLEM — N93.9 ABNORMAL UTERINE BLEEDING (AUB): Status: RESOLVED | Noted: 2023-08-17 | Resolved: 2023-10-16

## 2023-10-16 PROBLEM — E66.812 CLASS 2 SEVERE OBESITY WITH SERIOUS COMORBIDITY AND BODY MASS INDEX (BMI) OF 38.0 TO 38.9 IN ADULT: Chronic | Status: ACTIVE | Noted: 2023-08-17

## 2023-10-16 PROBLEM — R31.9 HEMATURIA: Status: RESOLVED | Noted: 2023-08-17 | Resolved: 2023-10-16

## 2023-10-16 PROBLEM — R79.89 ABNORMAL CBC: Status: RESOLVED | Noted: 2023-08-17 | Resolved: 2023-10-16

## 2023-10-16 PROBLEM — R11.2 MILD NAUSEA AND VOMITING: Status: RESOLVED | Noted: 2022-08-02 | Resolved: 2023-10-16

## 2023-10-16 PROBLEM — L72.3 SEBACEOUS CYST: Status: RESOLVED | Noted: 2023-03-06 | Resolved: 2023-10-16

## 2023-10-16 PROBLEM — R63.0 LOSS OF APPETITE: Status: RESOLVED | Noted: 2023-08-17 | Resolved: 2023-10-16

## 2023-10-16 PROCEDURE — 3008F BODY MASS INDEX DOCD: CPT | Performed by: FAMILY MEDICINE

## 2023-10-16 PROCEDURE — 3079F DIAST BP 80-89 MM HG: CPT | Performed by: FAMILY MEDICINE

## 2023-10-16 PROCEDURE — 1036F TOBACCO NON-USER: CPT | Performed by: FAMILY MEDICINE

## 2023-10-16 PROCEDURE — 97110 THERAPEUTIC EXERCISES: CPT | Mod: GP | Performed by: PHYSICAL THERAPIST

## 2023-10-16 PROCEDURE — 3074F SYST BP LT 130 MM HG: CPT | Performed by: FAMILY MEDICINE

## 2023-10-16 PROCEDURE — 99215 OFFICE O/P EST HI 40 MIN: CPT | Performed by: FAMILY MEDICINE

## 2023-10-16 RX ORDER — ALBUTEROL SULFATE 90 UG/1
1-2 AEROSOL, METERED RESPIRATORY (INHALATION) EVERY 4 HOURS PRN
Qty: 18 G | Refills: 0 | Status: SHIPPED | OUTPATIENT
Start: 2023-10-16 | End: 2023-12-11 | Stop reason: SDUPTHER

## 2023-10-16 RX ORDER — CYCLOBENZAPRINE HCL 10 MG
10 TABLET ORAL 3 TIMES DAILY PRN
COMMUNITY
Start: 2023-09-13 | End: 2023-10-16 | Stop reason: ALTCHOICE

## 2023-10-16 RX ORDER — GABAPENTIN 100 MG/1
100 CAPSULE ORAL NIGHTLY
Qty: 90 CAPSULE | Refills: 0 | COMMUNITY
Start: 2023-10-16

## 2023-10-16 RX ORDER — ATENOLOL 25 MG/1
25 TABLET ORAL DAILY
Qty: 90 TABLET | Refills: 0 | Status: SHIPPED | OUTPATIENT
Start: 2023-10-16 | End: 2024-03-06 | Stop reason: SDUPTHER

## 2023-10-16 RX ORDER — MELOXICAM 15 MG/1
15 TABLET ORAL DAILY
Qty: 90 TABLET | Refills: 0 | Status: SHIPPED | OUTPATIENT
Start: 2023-10-16 | End: 2024-03-06 | Stop reason: SDUPTHER

## 2023-10-16 ASSESSMENT — PATIENT HEALTH QUESTIONNAIRE - PHQ9
SUM OF ALL RESPONSES TO PHQ9 QUESTIONS 1 AND 2: 0
10. IF YOU CHECKED OFF ANY PROBLEMS, HOW DIFFICULT HAVE THESE PROBLEMS MADE IT FOR YOU TO DO YOUR WORK, TAKE CARE OF THINGS AT HOME, OR GET ALONG WITH OTHER PEOPLE: NOT DIFFICULT AT ALL
1. LITTLE INTEREST OR PLEASURE IN DOING THINGS: NOT AT ALL
2. FEELING DOWN, DEPRESSED OR HOPELESS: NOT AT ALL

## 2023-10-16 ASSESSMENT — ENCOUNTER SYMPTOMS
OCCASIONAL FEELINGS OF UNSTEADINESS: 0
ENDOCRINE NEGATIVE: 1
LOSS OF SENSATION IN FEET: 0
DEPRESSION: 0
EYES NEGATIVE: 1
RESPIRATORY NEGATIVE: 1

## 2023-10-16 NOTE — ASSESSMENT & PLAN NOTE
Obesity with BMI and comorbidities as noted above.   1. Discussed proper diet (low fat, low sodium, high fiber) with patient.   2. Discussed need for regular exercise (3 times per week, 20 minutes per session) with patient.

## 2023-10-16 NOTE — PATIENT INSTRUCTIONS
"Continue current medication regiment      Current Outpatient Medications:     albuterol (Ventolin HFA) 90 mcg/actuation inhaler, Inhale 1-2 puffs. Every 4 to 6 hours as needed, Disp: , Rfl:     atenolol (Tenormin) 25 mg tablet, TAKE 1 TABLET BY MOUTH EVERY DAY, Disp: 90 tablet, Rfl: 0    cyclobenzaprine (Flexeril) 10 mg tablet, Take 1 tablet (10 mg) by mouth 3 times a day as needed., Disp: , Rfl:     ketoconazole (NIZOral) 2 % cream, 1 Application., Disp: , Rfl:     levonorgestrel (Mirena) 21 mcg/24 hours (8 yrs) 52 mg IUD, by intrauterine route., Disp: , Rfl:     loratadine (Claritin) 10 mg tablet, Take 1 tablet (10 mg) by mouth once daily as needed., Disp: , Rfl:     montelukast (Singulair) 10 mg tablet, Take 1 tablet (10 mg) by mouth once daily at bedtime., Disp: , Rfl:     ruxolitinib (Opzelura) 1.5 % cream cream, APPLY ONE (1) APPLICATION TOPICALLY TWICE DAILY TO ACTIVE AREAS OF ECZEMA FOR UP TO 8 WEEKS AT A TIME., Disp: 60 g, Rfl: 3    traZODone (Desyrel) 50 mg tablet, Take 1 tablet (50 mg) by mouth as needed at bedtime for sleep., Disp: , Rfl:     triamcinolone (Kenalog) 0.5 % cream, Apply topically., Disp: , Rfl:     urea (Carmol) 40 % cream, 1 APPLICATION AT BEDTIME TOPICALLY TO AREAS OF THICKENED SKIN ON THE FEET, Disp: , Rfl:      \"0-1-5-Almost None!\"  Healthy Habits Start Early    EAT 5 OR MORE SERVINGS OF VEGETABLES AND FRUITS EVERY DAY.    Help Deja get three vegetables and two fruits each day. Red, green, yellow, orange...encourage them to try all the colors so they can enjoy different flavors and get more vitamins.    How can I help Deja do this?  ---------------------------------------------  -BE PATIENT WITH Deja, remember it may take 10 times before they start to like new food. So, start with small bites and just keep trying.  -Serve at least one vegetable or fruit at every meal. Even try two. Remember, portions do not have to be as big as you think.  -Encourage eating fruits and vegetables " instead of drinking them..it's a better way to get fiber and vitamins..so limit the amount of juice to 1/2 cup per day for children 1-6 years and one cup per day for children 7-18 years of age. Try using 1/2 part water and 1/2 part juice.    Spend less than two hours per day watching television and other screen media. Screen media includes video games, movies and computer use for entertainment.    How can I help Deja do this?  -Turn off the TV at dinner. Dinner is the best time to hang out with your kids and just talk, learn about their day, and tell them about your day. Your kids have a lot to learn from you and dinner is a great time to share.

## 2023-10-16 NOTE — PROGRESS NOTES
"Physical Therapy    Physical Therapy Treatment    Patient Name: Deja Hazel  MRN: [id number]  Today's Date: 10/16/2023  Time Calculation  Start Time: 1653  Stop Time: 1731  Time Calculation (min): 38 min    Visit #: 18  Visits Approved: 30  Authorization needed: No    Assessment:   Pt tolerated session well with no increases in reported pain. Pt challenged with scapular retraction and depression throughout session, but improved with cueing. Pt will transfer to another clinic to try aquatic therapy to improve tolerance to exercises and demonstrate improvements in sx.     Plan:   Pt will schedule aquatic therapy appt to improve tolerance to exercises.     Current Problem  1. Low back pain        2. Right cervical radiculopathy              Subjective   General   Pt states that she sits with PPT, it takes pressure off her back and improves her sx. Was house sitting this past weekend and d/t pain and tingling had to sleep in recliner. No change in sx. Not performing exercises regularly. Pt went to MD for BLE tingling and MD noted pinched nerve with disc herniation.    Precautions  None    Pain  0/10 - no pain just tingling     Objective     Treatments:    Therapeutic Exercise:   Pec door stretch 30\" x 3  Open/close book on wall 3-5\" x 10 R/L  B shoulder ext palm up green TB x 20  B rows black TB x 20  B horizontal ABD red TB x 20     Education:  Pt educated on posture and relationship to compressing of nerve.   "

## 2023-10-16 NOTE — PROGRESS NOTES
Subjective   Patient ID: Deja Hazel is a 43 y.o. female who presents for FMLA has  (Needs new forms filled out) and New order for Aquatic Therapy (With new diagnosis;  right hip pain and spasm,  groin pain and tingling in arms and legs b/l, low back pain, neck and shoulder discomfort ).    Back Pain  This is a new problem. The current episode started 1 to 4 weeks ago. The problem has been gradually worsening since onset. The pain is present in the lumbar spine and sacro-iliac. The quality of the pain is described as aching, burning and shooting. The pain radiates to the right thigh and left thigh. The pain is at a severity of 5/10. The pain is moderate. The pain is The same all the time. The symptoms are aggravated by stress, lying down, position, sitting, standing, twisting, coughing and bending. Stiffness is present All day. Associated symptoms include leg pain, numbness, paresis, paresthesias, pelvic pain, tingling and weakness. Pertinent negatives include no abdominal pain, bladder incontinence, bowel incontinence, chest pain, dysuria, fever, headaches, perianal numbness or weight loss. Risk factors include obesity and menopause. She has tried bed rest, heat, home exercises, ice, chiropractic manipulation, NSAIDs and walking for the symptoms. The treatment provided no relief.      Subjective:   Deja Hazel is a 43 y.o. female with hypertension.  Current Outpatient Medications   Medication Sig Dispense Refill    ketoconazole (NIZOral) 2 % cream 1 Application.      levonorgestrel (Mirena) 21 mcg/24 hours (8 yrs) 52 mg IUD by intrauterine route.      ruxolitinib (Opzelura) 1.5 % cream cream APPLY ONE (1) APPLICATION TOPICALLY TWICE DAILY TO ACTIVE AREAS OF ECZEMA FOR UP TO 8 WEEKS AT A TIME. 60 g 3    albuterol (Ventolin HFA) 90 mcg/actuation inhaler Inhale 1-2 puffs every 4 hours if needed for wheezing. Every 4 to 6 hours as needed 18 g 0    atenolol (Tenormin) 25 mg tablet Take 1 tablet (25 mg) by  "mouth once daily. 90 tablet 0    gabapentin (Neurontin) 100 mg capsule Take 1 capsule (100 mg) by mouth once daily at bedtime. 90 capsule 0    meloxicam (Mobic) 15 mg tablet Take 1 tablet (15 mg) by mouth once daily. 90 tablet 0     No current facility-administered medications for this visit.      Hypertension ROS: taking medications as instructed, no medication side effects noted, no TIA's, no chest pain on exertion, no dyspnea on exertion, and no swelling of ankles.   New concerns: . She has an appointment with her OB in [place] for pap smear coming up. She has been experiencing some bilateral numbness and tingling in her bilateral U.E and L.E. (R>L). She has been working on relieving muscle spasms and numbness with her PT sessions which have been mildly helpful. Currently tolerating medications well. Inquiring about safety taking influenza vaccine.     Objective:   Blood Pressure 126/83 (BP Location: Left arm, Patient Position: Sitting, BP Cuff Size: Adult)   Pulse 64   Temperature 36.7 °C (98.1 °F)   Respiration 16   Height 1.651 m (5' 5\")   Weight 106 kg (234 lb)   Last Menstrual Period  (LMP Unknown)   Oxygen Saturation 96%   Body Mass Index 38.94 kg/m²      Review of Systems   Constitutional:  Negative for fever and weight loss.   HENT: Negative.     Eyes: Negative.    Respiratory: Negative.     Cardiovascular:  Negative for chest pain.   Gastrointestinal:  Negative for abdominal pain and bowel incontinence.   Endocrine: Negative.    Genitourinary:  Positive for pelvic pain. Negative for bladder incontinence and dysuria.   Musculoskeletal:  Positive for back pain.   Skin: Negative.    Neurological:  Positive for tingling, weakness, numbness and paresthesias. Negative for headaches.       Objective   Blood Pressure 126/83 (BP Location: Left arm, Patient Position: Sitting, BP Cuff Size: Adult)   Pulse 64   Temperature 36.7 °C (98.1 °F)   Respiration 16   Height 1.651 m (5' 5\")   Weight 106 kg (234 " lb)   Last Menstrual Period  (LMP Unknown)   Oxygen Saturation 96%   Body Mass Index 38.94 kg/m²     Physical Exam  Constitutional:       Appearance: Normal appearance. She is obese.   HENT:      Right Ear: Tympanic membrane, ear canal and external ear normal.      Left Ear: Tympanic membrane, ear canal and external ear normal.      Mouth/Throat:      Mouth: Mucous membranes are moist.   Cardiovascular:      Rate and Rhythm: Normal rate and regular rhythm.      Pulses: Normal pulses.      Heart sounds: Normal heart sounds.   Pulmonary:      Effort: Pulmonary effort is normal.      Breath sounds: Normal breath sounds.   Musculoskeletal:         General: Tenderness and signs of injury present.      Right shoulder: Tenderness and crepitus present. Decreased range of motion.      Right hip: Tenderness and crepitus present. Decreased range of motion.   Neurological:      Mental Status: She is alert.         Assessment/Plan   Problem List Items Addressed This Visit             ICD-10-CM    Asthma J45.909    Relevant Medications    albuterol (Ventolin HFA) 90 mcg/actuation inhaler    Class 2 severe obesity with serious comorbidity and body mass index (BMI) of 38.0 to 38.9 in adult (CMS/[facility]) (Chronic) E66.01, Z68.38     Obesity with BMI and comorbidities as noted above.   1. Discussed proper diet (low fat, low sodium, high fiber) with patient.   2. Discussed need for regular exercise (3 times per week, 20 minutes per session) with patient.            Low back pain M54.50    Relevant Medications    meloxicam (Mobic) 15 mg tablet     Other Visit Diagnoses       Diagnosis Codes    HTN (hypertension), benign    -  Primary I10    Relevant Medications    atenolol (Tenormin) 25 mg tablet    Mixed stress and urge urinary incontinence     N39.46    Keratosis     L57.0    Cervical radicular pain     M54.12    Relevant Medications    gabapentin (Neurontin) 100 mg capsule        Continue current medication regiment      Current  Outpatient Medications:     ketoconazole (NIZOral) 2 % cream, 1 Application., Disp: , Rfl:     levonorgestrel (Mirena) 21 mcg/24 hours (8 yrs) 52 mg IUD, by intrauterine route., Disp: , Rfl:     ruxolitinib (Opzelura) 1.5 % cream cream, APPLY ONE (1) APPLICATION TOPICALLY TWICE DAILY TO ACTIVE AREAS OF ECZEMA FOR UP TO 8 WEEKS AT A TIME., Disp: 60 g, Rfl: 3    albuterol (Ventolin HFA) 90 mcg/actuation inhaler, Inhale 1-2 puffs every 4 hours if needed for wheezing. Every 4 to 6 hours as needed, Disp: 18 g, Rfl: 0    atenolol (Tenormin) 25 mg tablet, Take 1 tablet (25 mg) by mouth once daily., Disp: 90 tablet, Rfl: 0    gabapentin (Neurontin) 100 mg capsule, Take 1 capsule (100 mg) by mouth once daily at bedtime., Disp: 90 capsule, Rfl: 0    meloxicam (Mobic) 15 mg tablet, Take 1 tablet (15 mg) by mouth once daily., Disp: 90 tablet, Rfl: 0

## 2023-10-23 ENCOUNTER — APPOINTMENT (OUTPATIENT)
Dept: PHYSICAL THERAPY | Facility: CLINIC | Age: 43
End: 2023-10-23
Payer: COMMERCIAL

## 2023-10-23 ASSESSMENT — ENCOUNTER SYMPTOMS
DYSURIA: 0
TINGLING: 1
PARESTHESIAS: 1
BACK PAIN: 1
LEG PAIN: 1
PERIANAL NUMBNESS: 0
PARESIS: 1
BOWEL INCONTINENCE: 0
WEAKNESS: 1
FEVER: 0
WEIGHT LOSS: 0
HEADACHES: 0
ABDOMINAL PAIN: 0
NUMBNESS: 1

## 2023-10-24 ENCOUNTER — SPECIALTY PHARMACY (OUTPATIENT)
Dept: PHARMACY | Facility: CLINIC | Age: 43
End: 2023-10-24

## 2023-10-24 ENCOUNTER — TELEPHONE (OUTPATIENT)
Dept: PRIMARY CARE | Facility: CLINIC | Age: 43
End: 2023-10-24
Payer: COMMERCIAL

## 2023-10-24 NOTE — TELEPHONE ENCOUNTER
Pt left a message regarding her FMLA forms. I just received them today & did not get a chance to work on them. I will call pt when I am back in the office on Thurs

## 2023-10-25 ENCOUNTER — PHARMACY VISIT (OUTPATIENT)
Dept: PHARMACY | Facility: CLINIC | Age: 43
End: 2023-10-25
Payer: COMMERCIAL

## 2023-10-26 ENCOUNTER — PHARMACY VISIT (OUTPATIENT)
Dept: PHARMACY | Facility: CLINIC | Age: 43
End: 2023-10-26
Payer: COMMERCIAL

## 2023-10-30 ENCOUNTER — APPOINTMENT (OUTPATIENT)
Dept: PHYSICAL THERAPY | Facility: CLINIC | Age: 43
End: 2023-10-30
Payer: COMMERCIAL

## 2023-10-30 NOTE — TELEPHONE ENCOUNTER
Called pt since I hadn't heard back from her. She would like it filled out the same as the last FMLA. Date it for the day after previous ones  (so start date of 10-11-23 for 6 months). I will get it done & faxed hopefully by tomorrow

## 2023-11-03 ENCOUNTER — PATIENT MESSAGE (OUTPATIENT)
Dept: PRIMARY CARE | Facility: CLINIC | Age: 43
End: 2023-11-03
Payer: COMMERCIAL

## 2023-11-17 ENCOUNTER — TELEPHONE (OUTPATIENT)
Dept: PRIMARY CARE | Facility: CLINIC | Age: 43
End: 2023-11-17
Payer: COMMERCIAL

## 2023-11-17 ENCOUNTER — PHARMACY VISIT (OUTPATIENT)
Dept: PHARMACY | Facility: CLINIC | Age: 43
End: 2023-11-17
Payer: COMMERCIAL

## 2023-11-17 PROCEDURE — RXMED WILLOW AMBULATORY MEDICATION CHARGE

## 2023-11-17 RX ORDER — AZITHROMYCIN 250 MG/1
TABLET, FILM COATED ORAL
Qty: 6 TABLET | Refills: 0 | OUTPATIENT
Start: 2023-11-17

## 2023-11-21 ENCOUNTER — PHARMACY VISIT (OUTPATIENT)
Dept: PHARMACY | Facility: CLINIC | Age: 43
End: 2023-11-21
Payer: COMMERCIAL

## 2023-11-21 PROCEDURE — RXMED WILLOW AMBULATORY MEDICATION CHARGE

## 2023-11-28 ENCOUNTER — TELEPHONE (OUTPATIENT)
Dept: PRIMARY CARE | Facility: CLINIC | Age: 43
End: 2023-11-28

## 2023-11-28 DIAGNOSIS — K04.7 TOOTH INFECTION: Primary | ICD-10-CM

## 2023-12-06 RX ORDER — CEPHALEXIN 500 MG/1
500 CAPSULE ORAL 2 TIMES DAILY
Qty: 20 CAPSULE | Refills: 0 | Status: SHIPPED | OUTPATIENT
Start: 2023-12-06 | End: 2023-12-16

## 2023-12-11 ENCOUNTER — SPECIALTY PHARMACY (OUTPATIENT)
Dept: PHARMACY | Facility: CLINIC | Age: 43
End: 2023-12-11

## 2023-12-11 DIAGNOSIS — J45.20 MILD INTERMITTENT ASTHMA WITHOUT COMPLICATION (HHS-HCC): ICD-10-CM

## 2023-12-11 RX ORDER — ALBUTEROL SULFATE 90 UG/1
1-2 AEROSOL, METERED RESPIRATORY (INHALATION) EVERY 4 HOURS PRN
Qty: 18 G | Refills: 0 | Status: SHIPPED | OUTPATIENT
Start: 2023-12-11

## 2023-12-13 ENCOUNTER — APPOINTMENT (OUTPATIENT)
Dept: DERMATOLOGY | Facility: CLINIC | Age: 43
End: 2023-12-13
Payer: COMMERCIAL

## 2023-12-18 ENCOUNTER — TELEPHONE (OUTPATIENT)
Dept: PRIMARY CARE | Facility: CLINIC | Age: 43
End: 2023-12-18
Payer: COMMERCIAL

## 2023-12-18 DIAGNOSIS — M54.50 CHRONIC LOW BACK PAIN, UNSPECIFIED BACK PAIN LATERALITY, UNSPECIFIED WHETHER SCIATICA PRESENT: ICD-10-CM

## 2023-12-18 DIAGNOSIS — G89.29 CHRONIC LOW BACK PAIN, UNSPECIFIED BACK PAIN LATERALITY, UNSPECIFIED WHETHER SCIATICA PRESENT: ICD-10-CM

## 2023-12-18 PROCEDURE — RXMED WILLOW AMBULATORY MEDICATION CHARGE
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
## 2023-12-19 ENCOUNTER — OFFICE VISIT (OUTPATIENT)
Dept: DERMATOLOGY | Facility: CLINIC | Age: 43
End: 2023-12-19
Payer: COMMERCIAL

## 2023-12-19 DIAGNOSIS — L30.0 NUMMULAR DERMATITIS: ICD-10-CM

## 2023-12-19 DIAGNOSIS — L84 CALLUS: ICD-10-CM

## 2023-12-19 DIAGNOSIS — B35.3 TINEA PEDIS OF BOTH FEET: Primary | ICD-10-CM

## 2023-12-19 PROCEDURE — 1036F TOBACCO NON-USER: CPT | Performed by: DERMATOLOGY

## 2023-12-19 PROCEDURE — 99214 OFFICE O/P EST MOD 30 MIN: CPT | Performed by: DERMATOLOGY

## 2023-12-19 PROCEDURE — 3008F BODY MASS INDEX DOCD: CPT | Performed by: DERMATOLOGY

## 2023-12-19 RX ORDER — KETOCONAZOLE 20 MG/G
CREAM TOPICAL
Qty: 30 G | Refills: 2 | Status: SHIPPED | OUTPATIENT
Start: 2023-12-19

## 2023-12-19 ASSESSMENT — DERMATOLOGY QUALITY OF LIFE (QOL) ASSESSMENT
WHAT SINGLE SKIN CONDITION LISTED BELOW IS THE PATIENT ANSWERING THE QUALITY-OF-LIFE ASSESSMENT QUESTIONS ABOUT: DERMATITIS
DATE THE QUALITY-OF-LIFE ASSESSMENT WAS COMPLETED: 66827
RATE HOW BOTHERED YOU ARE BY SYMPTOMS OF YOUR SKIN PROBLEM (EG, ITCHING, STINGING BURNING, HURTING OR SKIN IRRITATION): 5
RATE HOW EMOTIONALLY BOTHERED YOU ARE BY YOUR SKIN PROBLEM (FOR EXAMPLE, WORRY, EMBARRASSMENT, FRUSTRATION): 1
WHAT SINGLE SKIN CONDITION LISTED BELOW IS THE PATIENT ANSWERING THE QUALITY-OF-LIFE ASSESSMENT QUESTIONS ABOUT: DERMATITIS
RATE HOW BOTHERED YOU ARE BY EFFECTS OF YOUR SKIN PROBLEMS ON YOUR ACTIVITIES (EG, GOING OUT, ACCOMPLISHING WHAT YOU WANT, WORK ACTIVITIES OR YOUR RELATIONSHIPS WITH OTHERS): 1
RATE HOW EMOTIONALLY BOTHERED YOU ARE BY YOUR SKIN PROBLEM (FOR EXAMPLE, WORRY, EMBARRASSMENT, FRUSTRATION): 1
RATE HOW BOTHERED YOU ARE BY EFFECTS OF YOUR SKIN PROBLEMS ON YOUR ACTIVITIES (EG, GOING OUT, ACCOMPLISHING WHAT YOU WANT, WORK ACTIVITIES OR YOUR RELATIONSHIPS WITH OTHERS): 1
RATE HOW BOTHERED YOU ARE BY SYMPTOMS OF YOUR SKIN PROBLEM (EG, ITCHING, STINGING BURNING, HURTING OR SKIN IRRITATION): 5

## 2023-12-19 NOTE — PROGRESS NOTES
Subjective     Deja Hazel is a 43 y.o. female who presents for the following: Tinea Pedis (Pt states putting opzelura on with no response. ) and nummular dermatitis (Left upper arm. Pt using opzelura to site with no response. ).     Review of Systems:  No other skin or systemic complaints other than what is documented elsewhere in the note.    The following portions of the chart were reviewed this encounter and updated as appropriate:   Tobacco  Allergies  Meds  Problems  Med Hx  Surg Hx  Fam Hx         Skin Cancer History  No skin cancer on file.      Specialty Problems          Dermatology Problems    Callus    Nummular dermatitis    Other specified epidermal thickening    Eczema        Objective   Well appearing patient in no apparent distress; mood and affect are within normal limits.    A focused skin examination was performed. All findings within normal limits unless otherwise noted below.    Assessment/Plan   1. Tinea pedis of both feet (2)  Left Foot - Posterior, Right Foot - Posterior  Erythema and scaling in a moccasin-distribution with interdigital web space maceration    -Discussed nature of the condition  -Patient has been applying Opzelura; discontinue opezlura  -Start ketoconazole to the area twice daily, soles of feet and in between toes  -This is a chronic condition and recurrence is likely without maintenance use of therapy.  -Keep affected areas as cool and dry as possible.  -Apply medication to the soles of feet and in between the toe web spaces until skin has returned to normal, then continue at least once weekly for maintenance        ketoconazole (NIZOral) 2 % cream - Left Foot - Posterior, Right Foot - Posterior  Apply to affected areas twice daily when active. Once controlled, apply once daily for maintenance.    2. Callus (2)  Left Foot - Posterior, Right Foot - Posterior  Hyperkeratotic plaques over weight bearing surfaces    -Discussed treatment options for this common,  recalcitrant skin condition  -Recommend consultation with a podiatrist if patient desires    3. Nummular dermatitis  Left Upper Arm - Posterior, Right Upper Arm - Posterior  Two small dime sized erythematous, coin shaped plaques on the arms    2 active lesions today, patient states they will resolve with a few days use of Opzelura. Patient has been abstaining from medication use to show lesions at the visit  -Patient notes that topical steroids are not effective  -Recommend to use Opzelura daily for a few days to new lesions if/when they arise        Follow up in 1 month for FSE  Discussed if there are any changes or development of concerning symptoms (lesion/skin condition is changing, bleeding, enlarging, or worsening) the patient is to contact my office. The patient verbalizes understanding.    Daisy Prince MD  12/19/2023

## 2023-12-21 ENCOUNTER — DOCUMENTATION (OUTPATIENT)
Dept: PHYSICAL THERAPY | Facility: CLINIC | Age: 43
End: 2023-12-21
Payer: COMMERCIAL
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
## 2023-12-22 ENCOUNTER — SPECIALTY PHARMACY (OUTPATIENT)
Dept: PHARMACY | Facility: CLINIC | Age: 43
End: 2023-12-22

## 2023-12-27 ENCOUNTER — PHARMACY VISIT (OUTPATIENT)
Dept: PHARMACY | Facility: CLINIC | Age: 43
End: 2023-12-27
Payer: COMMERCIAL

## 2024-02-20 ENCOUNTER — TELEPHONE (OUTPATIENT)
Dept: PRIMARY CARE | Facility: CLINIC | Age: 44
End: 2024-02-20
Payer: COMMERCIAL

## 2024-02-20 DIAGNOSIS — M54.13 RADICULOPATHY OF CERVICOTHORACIC REGION: ICD-10-CM

## 2024-02-20 NOTE — TELEPHONE ENCOUNTER
Pt made aware; I ordered cervical MRI. She said she would have to reschedule her appt so the new order can get prior authed
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
## 2024-02-21 ENCOUNTER — HOSPITAL ENCOUNTER (OUTPATIENT)
Dept: RADIOLOGY | Facility: CLINIC | Age: 44
End: 2024-02-21
Payer: COMMERCIAL

## 2024-03-06 DIAGNOSIS — M54.41 CHRONIC BILATERAL LOW BACK PAIN WITH RIGHT-SIDED SCIATICA: ICD-10-CM

## 2024-03-06 DIAGNOSIS — G89.29 CHRONIC BILATERAL LOW BACK PAIN WITH RIGHT-SIDED SCIATICA: ICD-10-CM

## 2024-03-06 DIAGNOSIS — I10 HTN (HYPERTENSION), BENIGN: ICD-10-CM

## 2024-03-07 ENCOUNTER — SPECIALTY PHARMACY (OUTPATIENT)
Dept: PHARMACY | Facility: CLINIC | Age: 44
End: 2024-03-07

## 2024-03-07 RX ORDER — MELOXICAM 15 MG/1
15 TABLET ORAL DAILY
Qty: 90 TABLET | Refills: 0 | Status: SHIPPED | OUTPATIENT
Start: 2024-03-07

## 2024-03-07 RX ORDER — ATENOLOL 25 MG/1
25 TABLET ORAL DAILY
Qty: 90 TABLET | Refills: 0 | Status: SHIPPED | OUTPATIENT
Start: 2024-03-07

## 2024-03-13 ENCOUNTER — SPECIALTY PHARMACY (OUTPATIENT)
Dept: PHARMACY | Facility: CLINIC | Age: 44
End: 2024-03-13

## 2024-03-15 DIAGNOSIS — L50.9 HIVES: Primary | ICD-10-CM

## 2024-03-15 RX ORDER — LORATADINE 10 MG/1
10 TABLET ORAL DAILY
COMMUNITY
End: 2024-03-15 | Stop reason: SDUPTHER

## 2024-03-15 RX ORDER — MONTELUKAST SODIUM 10 MG/1
10 TABLET ORAL DAILY
Qty: 90 TABLET | Refills: 1 | Status: SHIPPED | OUTPATIENT
Start: 2024-03-15 | End: 2024-09-11

## 2024-03-15 RX ORDER — LORATADINE 10 MG/1
10 TABLET ORAL DAILY
Qty: 90 TABLET | Refills: 1 | Status: SHIPPED | OUTPATIENT
Start: 2024-03-15

## 2024-03-25 ENCOUNTER — APPOINTMENT (OUTPATIENT)
Dept: DERMATOLOGY | Facility: CLINIC | Age: 44
End: 2024-03-25
Payer: COMMERCIAL

## 2024-05-22 ENCOUNTER — HOSPITAL ENCOUNTER (OUTPATIENT)
Dept: RADIOLOGY | Facility: CLINIC | Age: 44
End: 2024-05-22
Payer: COMMERCIAL

## 2024-05-22 ENCOUNTER — PATIENT MESSAGE (OUTPATIENT)
Dept: PRIMARY CARE | Facility: CLINIC | Age: 44
End: 2024-05-22
Payer: COMMERCIAL

## 2024-05-22 ENCOUNTER — APPOINTMENT (OUTPATIENT)
Dept: RADIOLOGY | Facility: CLINIC | Age: 44
End: 2024-05-22
Payer: COMMERCIAL

## 2024-05-23 PROCEDURE — RXMED WILLOW AMBULATORY MEDICATION CHARGE

## 2024-05-24 ENCOUNTER — PHARMACY VISIT (OUTPATIENT)
Dept: PHARMACY | Facility: CLINIC | Age: 44
End: 2024-05-24
Payer: COMMERCIAL

## 2024-05-24 ENCOUNTER — SPECIALTY PHARMACY (OUTPATIENT)
Dept: PHARMACY | Facility: CLINIC | Age: 44
End: 2024-05-24

## 2024-05-24 NOTE — PATIENT COMMUNICATION
Spoke to pt regarding FMLA. She said it doesn't need to be back dated because she hasn't needed any time off since 4/7 when the last paperwork ended. It is just for appts & PT; same as the last time.     Also scheduled her physical exam for the end of Aug so she will need lab orders & then she was asking if you would a order an in lab sleep study d/t waking up several times during the night & snoring.

## 2024-05-28 NOTE — PATIENT COMMUNICATION
Completed forms & put on 's desk to sign. I am not in the office tomorrow &  is not here Thurs or Fri so I would like to get them faxed today.

## 2024-06-06 ENCOUNTER — SPECIALTY PHARMACY (OUTPATIENT)
Dept: PHARMACY | Facility: CLINIC | Age: 44
End: 2024-06-06

## 2024-06-22 ENCOUNTER — OFFICE VISIT (OUTPATIENT)
Dept: PRIMARY CARE | Facility: CLINIC | Age: 44
End: 2024-06-22
Payer: COMMERCIAL

## 2024-06-22 VITALS
TEMPERATURE: 97.9 F | HEART RATE: 65 BPM | OXYGEN SATURATION: 95 % | SYSTOLIC BLOOD PRESSURE: 118 MMHG | WEIGHT: 235 LBS | DIASTOLIC BLOOD PRESSURE: 66 MMHG | BODY MASS INDEX: 39.11 KG/M2

## 2024-06-22 DIAGNOSIS — J40 BRONCHITIS: ICD-10-CM

## 2024-06-22 DIAGNOSIS — J45.909 MILD ASTHMA, UNSPECIFIED WHETHER COMPLICATED, UNSPECIFIED WHETHER PERSISTENT (HHS-HCC): Primary | ICD-10-CM

## 2024-06-22 PROCEDURE — 1036F TOBACCO NON-USER: CPT | Performed by: FAMILY MEDICINE

## 2024-06-22 PROCEDURE — 99213 OFFICE O/P EST LOW 20 MIN: CPT | Performed by: FAMILY MEDICINE

## 2024-06-22 PROCEDURE — 3008F BODY MASS INDEX DOCD: CPT | Performed by: FAMILY MEDICINE

## 2024-06-22 PROCEDURE — 87637 SARSCOV2&INF A&B&RSV AMP PRB: CPT

## 2024-06-22 RX ORDER — AZITHROMYCIN 250 MG/1
TABLET, FILM COATED ORAL DAILY
Qty: 6 TABLET | Refills: 0 | Status: SHIPPED | OUTPATIENT
Start: 2024-06-22 | End: 2024-06-27

## 2024-06-22 ASSESSMENT — ENCOUNTER SYMPTOMS
EYES NEGATIVE: 1
CHILLS: 0
SINUS PAIN: 1
FEVER: 0
RESPIRATORY NEGATIVE: 1
ABDOMINAL DISTENTION: 0
RHINORRHEA: 1
EYE PAIN: 0
CARDIOVASCULAR NEGATIVE: 1
ABDOMINAL PAIN: 0
ACTIVITY CHANGE: 0

## 2024-06-22 NOTE — PROGRESS NOTES
Subjective   Patient ID: Deja Hazel is a 44 y.o. female who presents for Nasal Congestion.      Patient presents with drainage congestion.  Patient had pressure in the frontal maxillary sinuses.    Some coughing has been noted as well she been out of town came back.  No high fever no shaking chills concerned about her asthma being an issue.           Review of Systems   Constitutional:  Negative for activity change, chills and fever.   HENT:  Positive for congestion, rhinorrhea and sinus pain. Negative for drooling and mouth sores.    Eyes: Negative.  Negative for pain.   Respiratory: Negative.     Cardiovascular: Negative.    Gastrointestinal:  Negative for abdominal distention and abdominal pain.       Objective   /66   Pulse 65   Temp 36.6 °C (97.9 °F)   Wt 107 kg (235 lb)   SpO2 95%   BMI 39.11 kg/m²   BSA Body surface area is 2.22 meters squared.      Physical Exam  Constitutional:       General: She is not in acute distress.     Appearance: Normal appearance.   HENT:      Head:      Comments: Pressure in the frontal and maxillary sinus  Cardiovascular:      Rate and Rhythm: Normal rate and regular rhythm.      Pulses: Normal pulses.      Heart sounds: Normal heart sounds.   Pulmonary:      Effort: Pulmonary effort is normal.      Breath sounds: Normal breath sounds.   Abdominal:      General: Abdomen is flat.      Palpations: Abdomen is soft.       Orders Only on 08/16/2023   Component Date Value Ref Range Status    SARS-CoV-2 Result 08/16/2023 DETECTED (A)  Not Detected Final    Comment: .  This test has received FDA Emergency Use Authorization (EUA) and has been   verified by [facility] ([facility]). This test   is only authorized for the duration of time that circumstances exist to   justify the authorization of the emergency use of in vitro diagnostic tests   for the detection of SARS-CoV-2 virus and/or diagnosis of COVID-19 infection   under section 564(b)(1)  of the Act, 21 U.S.C. 360bbb-3(b)(1), unless the   authorization is terminated or revoked sooner.     [facility] is certified under CLIA-88 as   qualified to perform high complexity testing. Testing is performed in the   [facility] located at [address] [place] [address].    SARS-CoV-2/Flu/RSV Multiplex Test:   Fact sheet for providers: https://www.fda.gov/media/525282/download  Fact sheet for patients: https://www.fda.gov/media/474705/download     Lab on 07/14/2023   Component Date Value Ref Range Status    Glucose 07/14/2023 105 (H)  74 - 99 mg/dL Final    Sodium 07/14/2023 139  136 - 145 mmol/L Final    Potassium 07/14/2023 4.2  3.5 - 5.3 mmol/L Final    Chloride 07/14/2023 106  98 - 107 mmol/L Final    Bicarbonate 07/14/2023 26  21 - 32 mmol/L Final    Anion Gap 07/14/2023 11  10 - 20 mmol/L Final    Urea Nitrogen 07/14/2023 16  6 - 23 mg/dL Final    Creatinine 07/14/2023 0.65  0.50 - 1.05 mg/dL Final    GFR Female 07/14/2023 >90  >90 mL/min/1.73m2 Final     CALCULATIONS OF ESTIMATED GFR ARE PERFORMED   USING THE 2021 CKD-EPI STUDY REFIT EQUATION   WITHOUT THE RACE VARIABLE FOR THE IDMS-TRACEABLE   CREATININE METHODS.    https://jasn.asnjournals.org/content/early/2021/09/22/ASN.8824476871    Calcium 07/14/2023 9.3  8.6 - 10.6 mg/dL Final    Albumin 07/14/2023 4.5  3.4 - 5.0 g/dL Final    Alkaline Phosphatase 07/14/2023 74  33 - 110 U/L Final    Total Protein 07/14/2023 7.1  6.4 - 8.2 g/dL Final    AST 07/14/2023 14  9 - 39 U/L Final    Total Bilirubin 07/14/2023 0.6  0.0 - 1.2 mg/dL Final    ALT (SGPT) 07/14/2023 19  7 - 45 U/L Final     Patients treated with Sulfasalazine may generate    falsely decreased results for ALT.    CRP 07/14/2023 1.06 (A)  mg/dL Final    REF VALUE  < 1.00    WBC 07/14/2023 10.2  4.4 - 11.3 x10E9/L Final    nRBC 07/14/2023 0.0  0.0 - 0.0 /100 WBC Final    RBC 07/14/2023 5.23 (H)  4.00 - 5.20 x10E12/L Final    Hemoglobin 07/14/2023 15.1  12.0 - 16.0  g/dL Final    Hematocrit 07/14/2023 46.2 (H)  36.0 - 46.0 % Final    MCV 07/14/2023 88  80 - 100 fL Final    MCHC 07/14/2023 32.7  32.0 - 36.0 g/dL Final    Platelets 07/14/2023 310  150 - 450 x10E9/L Final    RDW 07/14/2023 12.6  11.5 - 14.5 % Final    Neutrophils % 07/14/2023 70.4  40.0 - 80.0 % Final    Immature Granulocytes %, Automated 07/14/2023 0.5  0.0 - 0.9 % Final     Immature Granulocyte Count (IG) includes promyelocytes,    myelocytes and metamyelocytes but does not include bands.   Percent differential counts (%) should be interpreted in the   context of the absolute cell counts (cells/L).    Lymphocytes % 07/14/2023 20.3  13.0 - 44.0 % Final    Monocytes % 07/14/2023 6.6  2.0 - 10.0 % Final    Eosinophils % 07/14/2023 1.8  0.0 - 6.0 % Final    Basophils % 07/14/2023 0.4  0.0 - 2.0 % Final    Neutrophils Absolute 07/14/2023 7.20  1.20 - 7.70 x10E9/L Final    Lymphocytes Absolute 07/14/2023 2.07  1.20 - 4.80 x10E9/L Final    Monocytes Absolute 07/14/2023 0.67  0.10 - 1.00 x10E9/L Final    Eosinophils Absolute 07/14/2023 0.18  0.00 - 0.70 x10E9/L Final    Basophils Absolute 07/14/2023 0.04  0.00 - 0.10 x10E9/L Final    TSH 07/14/2023 1.91  0.44 - 3.98 mIU/L Final     TSH testing is performed using different testing    methodology at [facility] than at other    [facility]. Direct result comparisons should    only be made within the same method.    Insulin 07/14/2023 19  3 - 25 uIU/mL Final     Reference values apply to fasting specimens.    HIV 1 and 2 Screen 07/14/2023 NONREACTIVE  NONREACTIVE Final     HIV Ag/Ab screen is performed using the Siemens Quitbit   HIV Ag/Ab Combo assay which detects the presence of HIV    p24 antigen as well as antibodies to HIV-1   (Group M and O) and HIV-2.  .  No laboratory evidence of HIV infection. If acute HIV infection is   suspected, consider testing for HIV RNA by PCR (viral load).    Hepatitis C Ab 07/14/2023 NONREACTIVE  NONREACTIVE Final      Results from patients taking biotin supplements or receiving   high-dose biotin therapy should be interpreted with caution   due to possible interference with this test. Providers may    contact their local laboratory for further information.    Hemoglobin A1C 07/14/2023 5.5  % Final         Diagnosis of Diabetes-Adults   Non-Diabetic: < or = 5.6%   Increased risk for developing diabetes: 5.7-6.4%   Diagnostic of diabetes: > or = 6.5%  .       Monitoring of Diabetes                Age (y)     Therapeutic Goal (%)   Adults:          >18           <7.0   Pediatrics:    13-18           <7.5                   7-12           <8.0                   0- 6            7.5-8.5   American Diabetes Association. Diabetes Care 33(S1), Jan 2010.    Estimated Average Glucose 07/14/2023 111  MG/DL Final   Lab on 07/13/2023   Component Date Value Ref Range Status    Urine Culture 07/13/2023 Culture Comments - See Below   Final     Current Outpatient Medications on File Prior to Visit   Medication Sig Dispense Refill    albuterol (Ventolin HFA) 90 mcg/actuation inhaler Inhale 1-2 puffs every 4 to 6 hours as needed for wheezing. 18 g 0    atenolol (Tenormin) 25 mg tablet Take 1 tablet (25 mg) by mouth once daily. 90 tablet 0    azithromycin (Zithromax) 250 mg tablet take 2 tablets by mouth on day 1 then 1 tablet by mouth every day after 6 tablet 0    gabapentin (Neurontin) 100 mg capsule Take 1 capsule (100 mg) by mouth once daily at bedtime. 90 capsule 0    ketoconazole (NIZOral) 2 % cream Apply to affected areas twice daily when active. Once controlled, apply once daily for maintenance. 30 g 2    levonorgestrel (Mirena) 21 mcg/24 hours (8 yrs) 52 mg IUD by intrauterine route.      loratadine (Claritin) 10 mg tablet Take 1 tablet (10 mg) by mouth once daily. 90 tablet 1    meloxicam (Mobic) 15 mg tablet Take 1 tablet (15 mg) by mouth once daily. 90 tablet 0    montelukast (Singulair) 10 mg tablet Take 1 tablet (10 mg) by mouth once  daily. 90 tablet 1    ruxolitinib (Opzelura) 1.5 % cream cream APPLY ONE (1) APPLICATION TOPICALLY TWICE DAILY TO ACTIVE AREAS OF ECZEMA FOR UP TO 8 WEEKS AT A TIME. 60 g 3     No current facility-administered medications on file prior to visit.     No images are attached to the encounter.            Assessment/Plan   Problem List Items Addressed This Visit             ICD-10-CM    Asthma ([facility]) - Primary J45.909     Other Visit Diagnoses         Codes    Bronchitis     J40    Relevant Medications    azithromycin (Zithromax) 250 mg tablet

## 2024-06-22 NOTE — PATIENT INSTRUCTIONS
Treating for sinusitis and early bronchitis.    Please take antibiotic as directed.    Any high fevers or shaking chills or other change please call and let me know.  Viral cultures also being performed.

## 2024-06-23 LAB
FLUAV RNA RESP QL NAA+PROBE: NOT DETECTED
FLUBV RNA RESP QL NAA+PROBE: NOT DETECTED
RSV RNA RESP QL NAA+PROBE: NOT DETECTED
SARS-COV-2 RNA RESP QL NAA+PROBE: NOT DETECTED

## 2024-06-24 DIAGNOSIS — J45.20 MILD INTERMITTENT ASTHMA WITHOUT COMPLICATION (HHS-HCC): ICD-10-CM

## 2024-06-25 RX ORDER — ALBUTEROL SULFATE 90 UG/1
1-2 AEROSOL, METERED RESPIRATORY (INHALATION) EVERY 4 HOURS PRN
Qty: 18 G | Refills: 0 | Status: SHIPPED | OUTPATIENT
Start: 2024-06-25

## 2024-07-16 DIAGNOSIS — K21.9 GASTROESOPHAGEAL REFLUX DISEASE, UNSPECIFIED WHETHER ESOPHAGITIS PRESENT: ICD-10-CM

## 2024-08-08 ENCOUNTER — SPECIALTY PHARMACY (OUTPATIENT)
Dept: PHARMACY | Facility: CLINIC | Age: 44
End: 2024-08-08

## 2024-08-08 DIAGNOSIS — I10 HTN (HYPERTENSION), BENIGN: ICD-10-CM

## 2024-08-08 RX ORDER — ATENOLOL 25 MG/1
25 TABLET ORAL DAILY
Qty: 90 TABLET | Refills: 0 | Status: SHIPPED | OUTPATIENT
Start: 2024-08-08

## 2024-08-09 ENCOUNTER — HOSPITAL ENCOUNTER (OUTPATIENT)
Dept: RADIOLOGY | Facility: CLINIC | Age: 44
Discharge: HOME | End: 2024-08-09
Payer: COMMERCIAL

## 2024-08-09 DIAGNOSIS — G89.29 CHRONIC LOW BACK PAIN, UNSPECIFIED BACK PAIN LATERALITY, UNSPECIFIED WHETHER SCIATICA PRESENT: ICD-10-CM

## 2024-08-09 DIAGNOSIS — M54.50 CHRONIC LOW BACK PAIN, UNSPECIFIED BACK PAIN LATERALITY, UNSPECIFIED WHETHER SCIATICA PRESENT: ICD-10-CM

## 2024-08-09 DIAGNOSIS — M54.13 RADICULOPATHY OF CERVICOTHORACIC REGION: ICD-10-CM

## 2024-08-09 PROCEDURE — 72141 MRI NECK SPINE W/O DYE: CPT

## 2024-08-09 PROCEDURE — 72148 MRI LUMBAR SPINE W/O DYE: CPT

## 2024-08-27 ENCOUNTER — TELEPHONE (OUTPATIENT)
Dept: DERMATOLOGY | Facility: CLINIC | Age: 44
End: 2024-08-27
Payer: COMMERCIAL

## 2024-08-27 DIAGNOSIS — L30.0 NUMMULAR DERMATITIS: ICD-10-CM

## 2024-08-27 DIAGNOSIS — B35.3 TINEA PEDIS OF BOTH FEET: ICD-10-CM

## 2024-08-27 DIAGNOSIS — L84 CALLUS: Primary | ICD-10-CM

## 2024-08-27 RX ORDER — KETOCONAZOLE 20 MG/G
CREAM TOPICAL
Qty: 180 G | Refills: 1 | Status: SHIPPED | OUTPATIENT
Start: 2024-08-27

## 2024-08-27 RX ORDER — UREA 40 %
CREAM (GRAM) TOPICAL
Qty: 255 G | Refills: 1 | Status: SHIPPED | OUTPATIENT
Start: 2024-08-27

## 2024-08-27 NOTE — TELEPHONE ENCOUNTER
Pt called office and left message requesting refills on medications.    Pt was contacted and she would like 90 day refills sent to the  Pharmacy at [facility] of Ketoconazole, Opzelura, and Urea 40%.  Pt insurance is changing and she would like to have refills sent in before her insurance changes.     Thank you!    Guillaume Jha LPN

## 2024-08-27 NOTE — PROGRESS NOTES
Patient requests 90 day supply refills for opzelura, ketoconazole cream and urea cream to [facility] pharmacy. Rx's sent. Nurse to notify the patient.    Daisy Prince MD  08/27/24

## 2024-08-29 ENCOUNTER — TELEPHONE (OUTPATIENT)
Dept: PRIMARY CARE | Facility: CLINIC | Age: 44
End: 2024-08-29

## 2024-08-29 ENCOUNTER — HOSPITAL ENCOUNTER (OUTPATIENT)
Dept: RADIOLOGY | Facility: CLINIC | Age: 44
Discharge: HOME | End: 2024-08-29
Payer: COMMERCIAL

## 2024-08-29 ENCOUNTER — APPOINTMENT (OUTPATIENT)
Dept: PRIMARY CARE | Facility: CLINIC | Age: 44
End: 2024-08-29
Payer: COMMERCIAL

## 2024-08-29 VITALS
DIASTOLIC BLOOD PRESSURE: 80 MMHG | BODY MASS INDEX: 39.82 KG/M2 | HEART RATE: 78 BPM | WEIGHT: 239 LBS | HEIGHT: 65 IN | OXYGEN SATURATION: 96 % | SYSTOLIC BLOOD PRESSURE: 137 MMHG | RESPIRATION RATE: 18 BRPM

## 2024-08-29 VITALS — HEIGHT: 65 IN | WEIGHT: 239 LBS | BODY MASS INDEX: 39.82 KG/M2

## 2024-08-29 DIAGNOSIS — Z13.89 SCREENING FOR MULTIPLE CONDITIONS: ICD-10-CM

## 2024-08-29 DIAGNOSIS — M54.12 CERVICAL RADICULAR PAIN: ICD-10-CM

## 2024-08-29 DIAGNOSIS — E55.9 VITAMIN D DEFICIENCY: ICD-10-CM

## 2024-08-29 DIAGNOSIS — J45.20 MILD INTERMITTENT ASTHMA WITHOUT COMPLICATION (HHS-HCC): ICD-10-CM

## 2024-08-29 DIAGNOSIS — Z12.31 ENCOUNTER FOR SCREENING MAMMOGRAM FOR MALIGNANT NEOPLASM OF BREAST: ICD-10-CM

## 2024-08-29 DIAGNOSIS — Z00.00 ENCOUNTER FOR ANNUAL GENERAL MEDICAL EXAMINATION WITHOUT ABNORMAL FINDINGS IN ADULT: Primary | ICD-10-CM

## 2024-08-29 DIAGNOSIS — Z12.11 ENCOUNTER FOR SCREENING FOR MALIGNANT NEOPLASM OF COLON: ICD-10-CM

## 2024-08-29 DIAGNOSIS — G89.29 CHRONIC BILATERAL LOW BACK PAIN WITH RIGHT-SIDED SCIATICA: ICD-10-CM

## 2024-08-29 DIAGNOSIS — N83.209 CYST OF OVARY, UNSPECIFIED LATERALITY: ICD-10-CM

## 2024-08-29 DIAGNOSIS — R73.9 HYPERGLYCEMIA: ICD-10-CM

## 2024-08-29 DIAGNOSIS — E66.01 CLASS 2 SEVERE OBESITY DUE TO EXCESS CALORIES WITH SERIOUS COMORBIDITY AND BODY MASS INDEX (BMI) OF 39.0 TO 39.9 IN ADULT (MULTI): ICD-10-CM

## 2024-08-29 DIAGNOSIS — I10 HTN (HYPERTENSION), BENIGN: ICD-10-CM

## 2024-08-29 DIAGNOSIS — M54.41 CHRONIC BILATERAL LOW BACK PAIN WITH RIGHT-SIDED SCIATICA: ICD-10-CM

## 2024-08-29 DIAGNOSIS — E53.8 VITAMIN B 12 DEFICIENCY: ICD-10-CM

## 2024-08-29 LAB
POC APPEARANCE, URINE: CLEAR
POC BILIRUBIN, URINE: NEGATIVE
POC BLOOD, URINE: NEGATIVE
POC COLOR, URINE: YELLOW
POC GLUCOSE, URINE: NEGATIVE MG/DL
POC KETONES, URINE: NEGATIVE MG/DL
POC LEUKOCYTES, URINE: NEGATIVE
POC NITRITE,URINE: NEGATIVE
POC PH, URINE: 5.5 PH
POC PROTEIN, URINE: NEGATIVE MG/DL
POC SPECIFIC GRAVITY, URINE: >=1.03
POC UROBILINOGEN, URINE: 0.2 EU/DL

## 2024-08-29 PROCEDURE — 77067 SCR MAMMO BI INCL CAD: CPT

## 2024-08-29 PROCEDURE — 88175 CYTOPATH C/V AUTO FLUID REDO: CPT

## 2024-08-29 RX ORDER — CELECOXIB 200 MG/1
200 CAPSULE ORAL DAILY
Qty: 90 CAPSULE | Refills: 0 | Status: SHIPPED | OUTPATIENT
Start: 2024-08-29

## 2024-08-29 RX ORDER — MONTELUKAST SODIUM 10 MG/1
10 TABLET ORAL DAILY
Qty: 90 TABLET | Refills: 1 | Status: SHIPPED | OUTPATIENT
Start: 2024-08-29 | End: 2025-02-25

## 2024-08-29 RX ORDER — LORATADINE 10 MG/1
10 TABLET ORAL DAILY
Qty: 90 TABLET | Refills: 1 | Status: SHIPPED | OUTPATIENT
Start: 2024-08-29

## 2024-08-29 RX ORDER — GABAPENTIN 300 MG/1
CAPSULE ORAL
Qty: 60 CAPSULE | Refills: 0 | Status: SHIPPED | OUTPATIENT
Start: 2024-08-29

## 2024-08-29 ASSESSMENT — PROMIS GLOBAL HEALTH SCALE
RATE_PHYSICAL_HEALTH: FAIR
RATE_AVERAGE_PAIN: 5
RATE_SOCIAL_SATISFACTION: GOOD
CARRYOUT_PHYSICAL_ACTIVITIES: MODERATELY
RATE_AVERAGE_FATIGUE: MODERATE
RATE_QUALITY_OF_LIFE: GOOD
EMOTIONAL_PROBLEMS: SOMETIMES
RATE_MENTAL_HEALTH: GOOD
CARRYOUT_SOCIAL_ACTIVITIES: VERY GOOD
RATE_GENERAL_HEALTH: GOOD

## 2024-08-29 ASSESSMENT — ENCOUNTER SYMPTOMS
DIZZINESS: 0
EYE REDNESS: 0
JOINT SWELLING: 0
POLYDIPSIA: 0
PALPITATIONS: 0
EYE DISCHARGE: 0
DIAPHORESIS: 0
BLOOD IN STOOL: 0
CHEST TIGHTNESS: 0
COUGH: 0
SINUS PRESSURE: 0
FACIAL SWELLING: 0
FATIGUE: 0
SHORTNESS OF BREATH: 0
VOICE CHANGE: 0
EYE PAIN: 0
FLANK PAIN: 0
AGITATION: 0
RHINORRHEA: 0
SLEEP DISTURBANCE: 0
COLOR CHANGE: 0
DIARRHEA: 0
POLYPHAGIA: 0
NUMBNESS: 1
BACK PAIN: 1
SINUS PAIN: 0
FREQUENCY: 0
VOMITING: 0
ACTIVITY CHANGE: 0
HEMATURIA: 0
NERVOUS/ANXIOUS: 0
NECK STIFFNESS: 1
MYALGIAS: 0
ARTHRALGIAS: 0
WOUND: 0
WEAKNESS: 1
UNEXPECTED WEIGHT CHANGE: 0
EYE ITCHING: 0
SORE THROAT: 0
ADENOPATHY: 0
ABDOMINAL PAIN: 0
WHEEZING: 0
DYSURIA: 0
NAUSEA: 0
CHILLS: 0
FEVER: 0
CONSTIPATION: 0
TROUBLE SWALLOWING: 0
BRUISES/BLEEDS EASILY: 0
APPETITE CHANGE: 0

## 2024-08-29 NOTE — TELEPHONE ENCOUNTER
Pt forgot to ask at her appt today if she should continue with physical therapy    Please advise [phone]

## 2024-08-29 NOTE — PROGRESS NOTES
Subjective   Patient ID: Deja Hazel is a 44 y.o. female who presents for Annual Exam and Gynecologic Exam.  Today she is accompanied by alone.     Well Adult Physical   Patient here for a comprehensive physical exam.The patient reports problems - continued back pain and neck pain    Do you take any herbs or supplements that were not prescribed by a doctor? no   Are you taking calcium supplements? no   Are you taking aspirin daily? no     History:  LMP: No LMP recorded. Patient has had an implant.    Last pap date: 2023  Abnormal pap? no    Gynecologic Exam  Associated symptoms include back pain. Pertinent negatives include no abdominal pain, chills, constipation, diarrhea, dysuria, fever, flank pain, frequency, hematuria, nausea, rash, sore throat, urgency or vomiting.     Still has the rash  Back pain continues and needs refills for gabapentin. It is not lasting her all day.     Medication Documentation Review Audit       Reviewed by Jamila Escobar MD (Physician) on 08/29/24 at 0851      Medication Order Taking? Sig Documenting Provider Last Dose Status   albuterol (Ventolin HFA) 90 mcg/actuation inhaler [id number] Yes Inhale 1-2 puffs every 4 to 6 hours as needed for wheezing. Jamila Escobar MD Taking Active   atenolol (Tenormin) 25 mg tablet [id number] Yes Take 1 tablet (25 mg) by mouth once daily. Jamila Escobar MD  Active   celecoxib (CeleBREX) 200 mg capsule [id number]  Take 1 capsule (200 mg) by mouth once daily. Jamila Escobar MD  Active   Discontinued 08/29/24 0849   gabapentin (Neurontin) 300 mg capsule [id number]  Take 1 capsule at night time and then 2 times a day Jamila Escobar MD  Active     Discontinued 08/27/24 1552   ketoconazole (NIZOral) 2 % cream [id number] Yes Apply to affected areas twice daily when active. Once controlled, apply once daily for maintenance. Daisy Prince MD Taking Active   levonorgestrel (Mirena) 21 mcg/24 hours (8 yrs) 52 mg IUD [id number] Yes by intrauterine  route. Shanell Kline MD Taking Active   Discontinued 08/29/24 0850   loratadine (Claritin) 10 mg tablet [id number]  Take 1 tablet (10 mg) by mouth once daily. Jamila Escobar MD  Active     Discontinued 08/29/24 0846   Discontinued 08/29/24 0850   montelukast (Singulair) 10 mg tablet [id number]  Take 1 tablet (10 mg) by mouth once daily. Jamila Escobar MD  Active   ruxolitinib (Opzelura) 1.5 % cream [id number] Yes APPLY ONE (1) APPLICATION TOPICALLY TWICE DAILY TO ACTIVE AREAS OF ECZEMA FOR UP TO 8 WEEKS AT A TIME. Daisy Prince MD Taking Active     Discontinued 08/27/24 1552   urea (Carmol) 40 % cream [id number] Yes Apply to affected areas of thickened skin once daily as tolerated. Daisy Prince MD Taking Active                  Past Medical History:   Diagnosis Date    AB (asthmatic bronchitis) ([facility]) 07/13/2023    ADHD (attention deficit hyperactivity disorder)     Allergic     Anemia     Anxiety     Anxiety disorder, unspecified     Anxiety and depression    Class 2 severe obesity with serious comorbidity and body mass index (BMI) of 38.0 to 38.9 in adult (Multi) 08/17/2023    Clotting disorder (Multi) 1997    Diabetes mellitus (Multi) 1999    Eczema 1978    GERD (gastroesophageal reflux disease) 1985    Headache     History of cone biopsy of cervix 02/08/2019    Hypertension     Jaundice 1978    Personal history of cervical dysplasia     History of cervical dysplasia    Personal history of other diseases of the musculoskeletal system and connective tissue     History of arthritis    Personal history of other diseases of the respiratory system     History of asthma    Personal history of other endocrine, nutritional and metabolic disease     History of diabetes mellitus    Restless sleeper 08/17/2023    Scoliosis 1985    Sebaceous cyst 03/06/2023    KAROLYN (stress urinary incontinence, female) 08/17/2023    Suspected sleep apnea 07/13/2023    Urinary tract infection      Visual impairment      Patient Active Problem List   Diagnosis    Gastroesophageal reflux disease    Generalized anxiety disorder    Insomnia    Vitamin D deficiency    Abnormal glucose level    Cholelithiasis    Eczema    Fibroid, uterine    Food intolerance    Liver, polycystic    Right cervical radiculopathy    Asthma ([facility])    Class 2 severe obesity with serious comorbidity and body mass index (BMI) of 38.0 to 38.9 in adult (Multi)    Abnormal iron saturation    Groin pain    Low back pain    Callus    Nummular dermatitis    Other specified epidermal thickening    Cervical radicular pain     Family History   Problem Relation Name Age of Onset    Other (Other) Mother Mary Jane         bronchitis    Melanoma Mother Mary Jane     Arthritis Mother Mary Jane     Osteopenia Mother Mary Jane     Eczema Mother Mary Jane     MELANIA disease Mother Mary Jane     Allergies Mother Mary Jane         seasonal     labor Mother Mary Jane     Heart disease Father Ruben     Other (bronchitis) Father Ruben     Other (triple aortic aneurysm) Father Ruben     Allergies Father Ruben     Hypertension Father Ruben     Arthritis Father Ruben     Other (food allergy) Father Ruben     Other (sinus problem) Father Ruben     Other (sinus problem) Brother      Other (brain tumor) Brother      Allergies Daughter Shae     Celiac disease Daughter Shae     Asthma Daughter Shae     Allergies Son Yariel     Asthma Son Yariel     Learning disabilities Son Yariel     MELANIA disease Other grandmother     Hearing loss Brother Wesley     Vision loss Brother Wesley      Past Surgical History:   Procedure Laterality Date    BREAST SURGERY      OTHER SURGICAL HISTORY  2020    Breast reduction    OTHER SURGICAL HISTORY  2020    Cervical biopsy    WISDOM TOOTH EXTRACTION       Allergies   Allergen Reactions    Adhesive Tape-Silicones Itching    Augmentin [Amoxicillin-Pot Clavulanate] Unknown    Covid-19 Vaccine, Mrna, [id number], Lnp-S  (Pfizer) Unknown    Penicillins Unknown    Salicylates Other    Latex Hives, Itching and Rash    Prednisone Diarrhea, Hives, Itching and Rash     Other reaction(s): GI Upset     Immunization History   Administered Date(s) Administered    Flu vaccine (IIV4), preservative free *Check age/dose* 09/15/2021    Hep A / Hep B 05/27/2020    Hepatitis A vaccine, age 19 years and greater (HAVRIX) 05/07/2019    Hepatitis B vaccine, 19 yrs and under (RECOMBIVAX, ENGERIX) 05/07/2019    Hepatitis B vaccine, adult *Check Product/Dose* 02/19/2019    Influenza, Unspecified 10/23/2019    Influenza, injectable, quadrivalent 10/16/2018    Influenza, seasonal, injectable 10/16/2018    MMR vaccine, subcutaneous (MMR II) 02/19/2019    Moderna SARS-CoV-2 Vaccination 01/05/2021, 02/03/2021, 12/13/2021    PPD Test 02/05/2019, 02/13/2019    Pneumococcal Conjugate PCV 7 1978    Tdap vaccine, age 7 year and older (BOOSTRIX, ADACEL) 04/20/2016       Review of Systems   Constitutional:  Negative for activity change, appetite change, chills, diaphoresis, fatigue, fever and unexpected weight change.   HENT:  Negative for congestion, dental problem, drooling, ear discharge, ear pain, facial swelling, hearing loss, mouth sores, nosebleeds, postnasal drip, rhinorrhea, sinus pressure, sinus pain, sneezing, sore throat, tinnitus, trouble swallowing and voice change.    Eyes:  Negative for pain, discharge, redness, itching and visual disturbance.   Respiratory:  Negative for cough, chest tightness, shortness of breath and wheezing.    Cardiovascular:  Negative for chest pain, palpitations and leg swelling.   Gastrointestinal:  Negative for abdominal pain, blood in stool, constipation, diarrhea, nausea and vomiting.   Endocrine: Negative for cold intolerance, heat intolerance, polydipsia, polyphagia and polyuria.   Genitourinary:  Negative for decreased urine volume, dysuria, flank pain, frequency, hematuria and urgency.   Musculoskeletal:  Positive  "for back pain and neck stiffness. Negative for arthralgias, gait problem, joint swelling and myalgias.   Skin:  Negative for color change, pallor, rash and wound.   Neurological:  Positive for weakness and numbness. Negative for dizziness.   Hematological:  Negative for adenopathy. Does not bruise/bleed easily.   Psychiatric/Behavioral:  Negative for agitation, behavioral problems and sleep disturbance. The patient is not nervous/anxious.        Objective   /80 (BP Location: Right arm, Patient Position: Sitting, BP Cuff Size: Adult)   Pulse 78   Resp 18   Ht 1.651 m (5' 5\")   Wt 108 kg (239 lb)   SpO2 96%   BMI 39.77 kg/m²   BSA: 2.23 meters squared  Growth percentiles: Facility age limit for growth %nandini is 20 years. Facility age limit for growth %nandini is 20 years.     Physical Exam  Vitals and nursing note reviewed.   Constitutional:       General: She is not in acute distress.     Appearance: Normal appearance. She is normal weight. She is not ill-appearing, toxic-appearing or diaphoretic.   HENT:      Head: Normocephalic.      Right Ear: Tympanic membrane, ear canal and external ear normal. There is no impacted cerumen.      Left Ear: Tympanic membrane, ear canal and external ear normal. There is no impacted cerumen.      Nose: Nose normal. No rhinorrhea.      Mouth/Throat:      Mouth: Mucous membranes are moist.      Pharynx: Oropharynx is clear. No oropharyngeal exudate or posterior oropharyngeal erythema.   Eyes:      General: No scleral icterus.        Right eye: No discharge.         Left eye: No discharge.      Extraocular Movements: Extraocular movements intact.      Conjunctiva/sclera: Conjunctivae normal.      Pupils: Pupils are equal, round, and reactive to light.   Neck:      Vascular: No carotid bruit.   Cardiovascular:      Rate and Rhythm: Normal rate and regular rhythm.      Pulses: Normal pulses.      Heart sounds: Normal heart sounds. No murmur heard.     No friction rub. No gallop. "   Pulmonary:      Effort: Pulmonary effort is normal. No respiratory distress.      Breath sounds: Normal breath sounds. No stridor. No wheezing, rhonchi or rales.   Chest:      Chest wall: No tenderness.   Breasts:     Breasts are symmetrical.      Right: Normal. No swelling, bleeding, inverted nipple, mass, nipple discharge, skin change or tenderness.      Left: Normal. No swelling, bleeding, inverted nipple, mass, nipple discharge, skin change or tenderness.   Abdominal:      General: Abdomen is flat. Bowel sounds are normal. There is no distension.      Palpations: Abdomen is soft. There is no mass.      Tenderness: There is no abdominal tenderness. There is no right CVA tenderness, left CVA tenderness, guarding or rebound.      Hernia: No hernia is present.   Genitourinary:     General: Normal vulva.      Vagina: No vaginal discharge.      Rectum: Normal. Guaiac result negative.   Musculoskeletal:         General: No swelling, tenderness, deformity or signs of injury. Normal range of motion.      Cervical back: Normal range of motion and neck supple. No rigidity or tenderness.      Right lower leg: No edema.      Left lower leg: No edema.   Lymphadenopathy:      Cervical: No cervical adenopathy.      Upper Body:      Right upper body: No supraclavicular, axillary or pectoral adenopathy.      Left upper body: No supraclavicular, axillary or pectoral adenopathy.   Skin:     General: Skin is warm and dry.      Capillary Refill: Capillary refill takes more than 3 seconds.      Coloration: Skin is not jaundiced or pale.      Findings: No bruising, erythema, lesion or rash.   Neurological:      General: No focal deficit present.      Mental Status: She is alert and oriented to person, place, and time.      Cranial Nerves: No cranial nerve deficit.      Sensory: No sensory deficit.      Motor: No weakness.      Coordination: Coordination normal.      Gait: Gait normal.      Deep Tendon Reflexes: Reflexes normal.    Psychiatric:         Mood and Affect: Mood normal.         Behavior: Behavior normal.         Thought Content: Thought content normal.         Judgment: Judgment normal.         Assessment/Plan   Problem List Items Addressed This Visit             ICD-10-CM    Vitamin D deficiency E55.9    Relevant Orders    Vitamin D 25-Hydroxy,Total (for eval of Vitamin D levels)    Asthma ([facility]) J45.909    Relevant Medications    montelukast (Singulair) 10 mg tablet    loratadine (Claritin) 10 mg tablet    Class 2 severe obesity with serious comorbidity and body mass index (BMI) of 38.0 to 38.9 in adult (Multi) (Chronic) E66.01, Z68.38    Low back pain M54.50    Relevant Medications    celecoxib (CeleBREX) 200 mg capsule    Other Relevant Orders    TSH with reflex to Free T4 if abnormal    Follow Up In [facility] - PCP - Established    Cervical radicular pain M54.12    Relevant Medications    gabapentin (Neurontin) 300 mg capsule    celecoxib (CeleBREX) 200 mg capsule    Other Relevant Orders    TSH with reflex to Free T4 if abnormal    Follow Up In [facility] - PCP - Established     Other Visit Diagnoses         Codes    Encounter for annual general medical examination without abnormal findings in adult    -  Primary Z00.00    Relevant Orders    Lipid Panel    CBC and Auto Differential    Comprehensive Metabolic Panel    THINPREP PAP TEST    Screening for multiple conditions     Z13.89    Encounter for screening for malignant neoplasm of colon     Z12.11    Relevant Orders    Fecal Occult Blood Immunoassay    Vitamin B 12 deficiency     E53.8    Relevant Orders    Vitamin B12    HTN (hypertension), benign     I10    Relevant Orders    TSH with reflex to Free T4 if abnormal    Hyperglycemia     R73.9    Relevant Orders    Hemoglobin A1C          Current Outpatient Medications   Medication Sig Dispense Refill    albuterol (Ventolin HFA) 90 mcg/actuation inhaler Inhale 1-2 puffs every 4 to 6 hours as  needed for wheezing. 18 g 0    atenolol (Tenormin) 25 mg tablet Take 1 tablet (25 mg) by mouth once daily. 90 tablet 0    ketoconazole (NIZOral) 2 % cream Apply to affected areas twice daily when active. Once controlled, apply once daily for maintenance. 180 g 1    levonorgestrel (Mirena) 21 mcg/24 hours (8 yrs) 52 mg IUD by intrauterine route.      ruxolitinib (Opzelura) 1.5 % cream APPLY ONE (1) APPLICATION TOPICALLY TWICE DAILY TO ACTIVE AREAS OF ECZEMA FOR UP TO 8 WEEKS AT A TIME. 180 g 1    urea (Carmol) 40 % cream Apply to affected areas of thickened skin once daily as tolerated. 255 g 1    celecoxib (CeleBREX) 200 mg capsule Take 1 capsule (200 mg) by mouth once daily. 90 capsule 0    gabapentin (Neurontin) 300 mg capsule Take 1 capsule at night time and then 2 times a day 60 capsule 0    loratadine (Claritin) 10 mg tablet Take 1 tablet (10 mg) by mouth once daily. 90 tablet 1    montelukast (Singulair) 10 mg tablet Take 1 tablet (10 mg) by mouth once daily. 90 tablet 1     No current facility-administered medications for this visit.     Healthy female exam.     1. Fasting blood work  Meds changed per list  Pap done  2. Patient Counseling:  --Nutrition: Stressed importance of moderation in sodium/caffeine intake, saturated fat and cholesterol, caloric balance, sufficient intake of fresh fruits, vegetables, fiber, calcium, iron, and 1 mg of folate supplement per day (for females capable of pregnancy).  --Discussed the issue of estrogen replacement, calcium supplement, and the daily use of baby aspirin.  --Exercise: Stressed the importance of regular exercise.   --Substance Abuse: Discussed cessation/primary prevention of tobacco, alcohol, or other drug use; driving or other dangerous activities under the influence; availability of treatment for abuse.    --Sexuality: Discussed sexually transmitted diseases, partner selection, use of condoms, avoidance of unintended pregnancy  and contraceptive alternatives.    --Injury prevention: Discussed safety belts, safety helmets, smoke detector, smoking near bedding or upholstery.   --Dental health: Discussed importance of regular tooth brushing, flossing, and dental visits.  --Immunizations reviewed.  --Discussed benefits of screening colonoscopy.  --After hours service discussed with patient  3. Discussed the patient's BMI with her.  The BMI is above average. The patient received Current weight: 108 kg (239 lb)  Weight change since last visit (-) denotes wt loss 4 lbs   Weight loss needed to achieve BMI 25: 89.1 Lbs  Weight loss needed to achieve BMI 30: 59.1 Lbs    Advised to Increase physical activity because they have an above normal BMI.  4. Follow up in 3 months for back pain

## 2024-08-29 NOTE — PATIENT INSTRUCTIONS
Healthy female exam.     1. Fasting blood work  Meds changed per list  Pap done  2. Patient Counseling:  --Nutrition: Stressed importance of moderation in sodium/caffeine intake, saturated fat and cholesterol, caloric balance, sufficient intake of fresh fruits, vegetables, fiber, calcium, iron, and 1 mg of folate supplement per day (for females capable of pregnancy).  --Discussed the issue of estrogen replacement, calcium supplement, and the daily use of baby aspirin.  --Exercise: Stressed the importance of regular exercise.   --Substance Abuse: Discussed cessation/primary prevention of tobacco, alcohol, or other drug use; driving or other dangerous activities under the influence; availability of treatment for abuse.    --Sexuality: Discussed sexually transmitted diseases, partner selection, use of condoms, avoidance of unintended pregnancy  and contraceptive alternatives.   --Injury prevention: Discussed safety belts, safety helmets, smoke detector, smoking near bedding or upholstery.   --Dental health: Discussed importance of regular tooth brushing, flossing, and dental visits.  --Immunizations reviewed.  --Discussed benefits of screening colonoscopy.  --After hours service discussed with patient  3. Discussed the patient's BMI with her.  The BMI is above average. The patient received Current weight: 108 kg (239 lb)  Weight change since last visit (-) denotes wt loss 4 lbs   Weight loss needed to achieve BMI 25: 89.1 Lbs  Weight loss needed to achieve BMI 30: 59.1 Lbs    Advised to Increase physical activity because they have an above normal BMI.  4. Follow up in 3 months for back pain    Current Outpatient Medications   Medication Sig Dispense Refill    albuterol (Ventolin HFA) 90 mcg/actuation inhaler Inhale 1-2 puffs every 4 to 6 hours as needed for wheezing. 18 g 0    atenolol (Tenormin) 25 mg tablet Take 1 tablet (25 mg) by mouth once daily. 90 tablet 0    ketoconazole (NIZOral) 2 % cream Apply to affected  areas twice daily when active. Once controlled, apply once daily for maintenance. 180 g 1    levonorgestrel (Mirena) 21 mcg/24 hours (8 yrs) 52 mg IUD by intrauterine route.      ruxolitinib (Opzelura) 1.5 % cream APPLY ONE (1) APPLICATION TOPICALLY TWICE DAILY TO ACTIVE AREAS OF ECZEMA FOR UP TO 8 WEEKS AT A TIME. 180 g 1    urea (Carmol) 40 % cream Apply to affected areas of thickened skin once daily as tolerated. 255 g 1    celecoxib (CeleBREX) 200 mg capsule Take 1 capsule (200 mg) by mouth once daily. 90 capsule 0    gabapentin (Neurontin) 300 mg capsule Take 1 capsule at night time and then 2 times a day 60 capsule 0    loratadine (Claritin) 10 mg tablet Take 1 tablet (10 mg) by mouth once daily. 90 tablet 1    montelukast (Singulair) 10 mg tablet Take 1 tablet (10 mg) by mouth once daily. 90 tablet 1     No current facility-administered medications for this visit.

## 2024-08-30 NOTE — TELEPHONE ENCOUNTER
Pt would like to continue with PT and would also like a referral to ortho, can you you put in referrals to both.

## 2024-09-03 NOTE — TELEPHONE ENCOUNTER
Left message for patient, requested referrals have been placed. Any questions please call the office.

## 2024-09-11 ENCOUNTER — SPECIALTY PHARMACY (OUTPATIENT)
Dept: PHARMACY | Facility: CLINIC | Age: 44
End: 2024-09-11

## 2024-09-11 PROCEDURE — RXMED WILLOW AMBULATORY MEDICATION CHARGE

## 2024-09-12 ENCOUNTER — PHARMACY VISIT (OUTPATIENT)
Dept: PHARMACY | Facility: CLINIC | Age: 44
End: 2024-09-12
Payer: COMMERCIAL

## 2024-09-12 LAB
CYTOLOGY CMNT CVX/VAG CYTO-IMP: NORMAL
LAB AP CONTRACEPTIVE HISTORY: NORMAL
LAB AP HPV GENOTYPE QUESTION: YES
LAB AP HPV HR: NORMAL
LABORATORY COMMENT REPORT: NORMAL
PATH REPORT.TOTAL CANCER: NORMAL

## 2024-09-13 ENCOUNTER — PHARMACY VISIT (OUTPATIENT)
Dept: PHARMACY | Facility: CLINIC | Age: 44
End: 2024-09-13
Payer: COMMERCIAL

## 2024-09-13 PROCEDURE — RXMED WILLOW AMBULATORY MEDICATION CHARGE

## 2024-09-18 ENCOUNTER — APPOINTMENT (OUTPATIENT)
Dept: RADIOLOGY | Facility: CLINIC | Age: 44
End: 2024-09-18
Payer: COMMERCIAL

## 2024-10-02 ENCOUNTER — EVALUATION (OUTPATIENT)
Dept: PHYSICAL THERAPY | Facility: CLINIC | Age: 44
End: 2024-10-02
Payer: COMMERCIAL

## 2024-10-02 DIAGNOSIS — M54.50 CHRONIC LOW BACK PAIN, UNSPECIFIED BACK PAIN LATERALITY, UNSPECIFIED WHETHER SCIATICA PRESENT: ICD-10-CM

## 2024-10-02 DIAGNOSIS — M54.12 CERVICAL RADICULAR PAIN: ICD-10-CM

## 2024-10-02 DIAGNOSIS — G89.29 CHRONIC LOW BACK PAIN, UNSPECIFIED BACK PAIN LATERALITY, UNSPECIFIED WHETHER SCIATICA PRESENT: ICD-10-CM

## 2024-10-02 PROCEDURE — 97110 THERAPEUTIC EXERCISES: CPT | Mod: GP | Performed by: PHYSICAL THERAPIST

## 2024-10-02 PROCEDURE — 97162 PT EVAL MOD COMPLEX 30 MIN: CPT | Mod: GP | Performed by: PHYSICAL THERAPIST

## 2024-10-02 ASSESSMENT — PAIN SCALES - GENERAL: PAINLEVEL_OUTOF10: 6

## 2024-10-02 ASSESSMENT — PATIENT HEALTH QUESTIONNAIRE - PHQ9
SUM OF ALL RESPONSES TO PHQ9 QUESTIONS 1 AND 2: 0
2. FEELING DOWN, DEPRESSED OR HOPELESS: NOT AT ALL
1. LITTLE INTEREST OR PLEASURE IN DOING THINGS: NOT AT ALL

## 2024-10-02 ASSESSMENT — PAIN - FUNCTIONAL ASSESSMENT: PAIN_FUNCTIONAL_ASSESSMENT: 0-10

## 2024-10-02 ASSESSMENT — ENCOUNTER SYMPTOMS
DEPRESSION: 0
OCCASIONAL FEELINGS OF UNSTEADINESS: 0
LOSS OF SENSATION IN FEET: 0
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
## 2024-10-03 ASSESSMENT — DERMATOLOGY QUALITY OF LIFE (QOL) ASSESSMENT
WHAT SINGLE SKIN CONDITION LISTED BELOW IS THE PATIENT ANSWERING THE QUALITY-OF-LIFE ASSESSMENT QUESTIONS ABOUT: NONE OF THE ABOVE
RATE HOW BOTHERED YOU ARE BY EFFECTS OF YOUR SKIN PROBLEMS ON YOUR ACTIVITIES (EG, GOING OUT, ACCOMPLISHING WHAT YOU WANT, WORK ACTIVITIES OR YOUR RELATIONSHIPS WITH OTHERS): 1
RATE HOW BOTHERED YOU ARE BY SYMPTOMS OF YOUR SKIN PROBLEM (EG, ITCHING, STINGING BURNING, HURTING OR SKIN IRRITATION): 5
WHAT SINGLE SKIN CONDITION LISTED BELOW IS THE PATIENT ANSWERING THE QUALITY-OF-LIFE ASSESSMENT QUESTIONS ABOUT: NONE OF THE ABOVE
RATE HOW EMOTIONALLY BOTHERED YOU ARE BY YOUR SKIN PROBLEM (FOR EXAMPLE, WORRY, EMBARRASSMENT, FRUSTRATION): 0 - NEVER BOTHERED
RATE HOW EMOTIONALLY BOTHERED YOU ARE BY YOUR SKIN PROBLEM (FOR EXAMPLE, WORRY, EMBARRASSMENT, FRUSTRATION): 0 - NEVER BOTHERED
RATE HOW BOTHERED YOU ARE BY EFFECTS OF YOUR SKIN PROBLEMS ON YOUR ACTIVITIES (EG, GOING OUT, ACCOMPLISHING WHAT YOU WANT, WORK ACTIVITIES OR YOUR RELATIONSHIPS WITH OTHERS): 1
RATE HOW BOTHERED YOU ARE BY SYMPTOMS OF YOUR SKIN PROBLEM (EG, ITCHING, STINGING BURNING, HURTING OR SKIN IRRITATION): 5

## 2024-10-03 ASSESSMENT — PATIENT GLOBAL ASSESSMENT (PGA): WHAT IS THE PGA: PATIENT GLOBAL ASSESSMENT:  1 - CLEAR

## 2024-10-07 ENCOUNTER — APPOINTMENT (OUTPATIENT)
Dept: DERMATOLOGY | Facility: CLINIC | Age: 44
End: 2024-10-07
Payer: COMMERCIAL

## 2024-10-07 DIAGNOSIS — L84 CALLUS: ICD-10-CM

## 2024-10-07 DIAGNOSIS — L30.0 NUMMULAR DERMATITIS: ICD-10-CM

## 2024-10-07 DIAGNOSIS — L21.9 SEBORRHEIC DERMATITIS: ICD-10-CM

## 2024-10-07 DIAGNOSIS — B35.3 TINEA PEDIS OF BOTH FEET: Primary | ICD-10-CM

## 2024-10-07 PROCEDURE — 1036F TOBACCO NON-USER: CPT | Performed by: DERMATOLOGY

## 2024-10-07 PROCEDURE — 99214 OFFICE O/P EST MOD 30 MIN: CPT | Performed by: DERMATOLOGY

## 2024-10-07 RX ORDER — FLUOCINONIDE TOPICAL SOLUTION USP, 0.05% 0.5 MG/ML
SOLUTION TOPICAL
Qty: 180 ML | Refills: 1 | Status: SHIPPED | OUTPATIENT
Start: 2024-10-07

## 2024-10-07 NOTE — PROGRESS NOTES
Subjective     Deja Hazel is a 44 y.o. female who presents for the following: Tinea Pedis (Pt states uses ketoconazole cream with good response, requesting refills. ), Dermatitis (B/L upper arms- pt states uses opzelura with good response. States thinks she has areas starting on her hands and has only used otc topicals to hands. ), and Callouses (Feet- pt states uses urea cream with good response. Requesting refill. ).     Review of Systems:  No other skin or systemic complaints other than what is documented elsewhere in the note.    The following portions of the chart were reviewed this encounter and updated as appropriate:   Tobacco  Allergies  Meds  Problems  Med Hx  Surg Hx  Fam Hx           Specialty Problems          Dermatology Problems    Callus    Nummular dermatitis    Other specified epidermal thickening    Eczema        Objective   Well appearing patient in no apparent distress; mood and affect are within normal limits.    A focused skin examination was performed. All findings within normal limits unless otherwise noted below.    Assessment/Plan   1. Tinea pedis of both feet (2)  Left Foot - Posterior, Right Foot - Posterior  Erythema and scaling in a moccasin-distribution with interdigital web space maceration    Flaring off therapy  Patient states condition resolved with ketoconazole but then ran out of medication in August and could not get more medication. Patient states I cancelled their rx at the pharmacy.  -Showed the patient in EPIC their request for 90 day supply's of the medications (opzelura, ketoconazole and urea) and that this request was honored and rx's sent to [facility] pharmacy on 8/27/24. Patient states [facility] told the patient these medications were discontinued. Walked patient through ordering of medications on patient's [employer] portal on their mobile device; shows refills remaining for their 90 day supply's of these medications.  -Restart ketoconazole cream to the area  twice daily, soles of feet and in between toes  -This is a chronic condition and recurrence is likely without maintenance use of therapy. Once improved, use daily for maintenance.  -Keep affected areas as cool and dry as possible.  -Apply medication to the soles of feet and in between the toe web spaces until skin has returned to normal, then continue at least once weekly for maintenance        Related Medications  ketoconazole (NIZOral) 2 % cream  Apply to affected areas twice daily when active. Once controlled, apply once daily for maintenance.    2. Nummular dermatitis  Left Upper Arm - Posterior, Right Upper Arm - Posterior  Clear today    Continue Opzelura, refills available at [facility]  -Patient notes that topical steroids are not effective  -Recommend to use Opzelura daily for a few days to new lesions if/when they arise    Related Medications  ruxolitinib (Opzelura) 1.5 % cream  APPLY ONE (1) APPLICATION TOPICALLY TWICE DAILY TO ACTIVE AREAS OF ECZEMA FOR UP TO 8 WEEKS AT A TIME.    3. Callus (2)  Left Foot - Posterior, Right Foot - Posterior  Hyperkeratotic plaques over weight bearing surfaces    Restart urea (see above)    Related Medications  urea (Carmol) 40 % cream  Apply to affected areas of thickened skin once daily as tolerated.        Follow up in 1 year for eczema/tinea pedis  Patient may return for FSE as scheduled  Discussed if there are any changes or development of concerning symptoms (lesion/skin condition is changing, bleeding, enlarging, or worsening) the patient is to contact my office. The patient verbalizes understanding.    Daisy Prince MD  10/7/2024

## 2024-10-10 PROCEDURE — RXMED WILLOW AMBULATORY MEDICATION CHARGE

## 2024-10-11 ENCOUNTER — PHARMACY VISIT (OUTPATIENT)
Dept: PHARMACY | Facility: CLINIC | Age: 44
End: 2024-10-11
Payer: COMMERCIAL

## 2024-10-14 ENCOUNTER — LAB (OUTPATIENT)
Dept: LAB | Facility: CLINIC | Age: 44
End: 2024-10-14
Payer: COMMERCIAL

## 2024-10-14 DIAGNOSIS — E53.8 VITAMIN B 12 DEFICIENCY: ICD-10-CM

## 2024-10-14 DIAGNOSIS — Z00.00 ENCOUNTER FOR ANNUAL GENERAL MEDICAL EXAMINATION WITHOUT ABNORMAL FINDINGS IN ADULT: ICD-10-CM

## 2024-10-14 DIAGNOSIS — M54.41 CHRONIC BILATERAL LOW BACK PAIN WITH RIGHT-SIDED SCIATICA: ICD-10-CM

## 2024-10-14 DIAGNOSIS — I10 HTN (HYPERTENSION), BENIGN: ICD-10-CM

## 2024-10-14 DIAGNOSIS — M54.12 CERVICAL RADICULAR PAIN: ICD-10-CM

## 2024-10-14 DIAGNOSIS — E55.9 VITAMIN D DEFICIENCY: ICD-10-CM

## 2024-10-14 DIAGNOSIS — G89.29 CHRONIC BILATERAL LOW BACK PAIN WITH RIGHT-SIDED SCIATICA: ICD-10-CM

## 2024-10-14 DIAGNOSIS — R73.9 HYPERGLYCEMIA: ICD-10-CM

## 2024-10-14 LAB
25(OH)D3 SERPL-MCNC: 19 NG/ML (ref 30–100)
ALBUMIN SERPL BCP-MCNC: 4.3 G/DL (ref 3.4–5)
ALP SERPL-CCNC: 72 U/L (ref 33–110)
ALT SERPL W P-5'-P-CCNC: 18 U/L (ref 7–45)
ANION GAP SERPL CALC-SCNC: 12 MMOL/L (ref 10–20)
AST SERPL W P-5'-P-CCNC: 13 U/L (ref 9–39)
BASOPHILS # BLD AUTO: 0.03 X10*3/UL (ref 0–0.1)
BASOPHILS NFR BLD AUTO: 0.3 %
BILIRUB SERPL-MCNC: 0.4 MG/DL (ref 0–1.2)
BUN SERPL-MCNC: 12 MG/DL (ref 6–23)
CALCIUM SERPL-MCNC: 9 MG/DL (ref 8.6–10.6)
CHLORIDE SERPL-SCNC: 105 MMOL/L (ref 98–107)
CHOLEST SERPL-MCNC: 162 MG/DL (ref 0–199)
CHOLESTEROL/HDL RATIO: 3.1
CO2 SERPL-SCNC: 26 MMOL/L (ref 21–32)
CREAT SERPL-MCNC: 0.54 MG/DL (ref 0.5–1.05)
EGFRCR SERPLBLD CKD-EPI 2021: >90 ML/MIN/1.73M*2
EOSINOPHIL # BLD AUTO: 0.31 X10*3/UL (ref 0–0.7)
EOSINOPHIL NFR BLD AUTO: 3.2 %
ERYTHROCYTE [DISTWIDTH] IN BLOOD BY AUTOMATED COUNT: 12.6 % (ref 11.5–14.5)
EST. AVERAGE GLUCOSE BLD GHB EST-MCNC: 97 MG/DL
GLUCOSE SERPL-MCNC: 97 MG/DL (ref 74–99)
HBA1C MFR BLD: 5 %
HCT VFR BLD AUTO: 43.2 % (ref 36–46)
HDLC SERPL-MCNC: 52.2 MG/DL
HGB BLD-MCNC: 14.7 G/DL (ref 12–16)
IMM GRANULOCYTES # BLD AUTO: 0.01 X10*3/UL (ref 0–0.7)
IMM GRANULOCYTES NFR BLD AUTO: 0.1 % (ref 0–0.9)
LDLC SERPL CALC-MCNC: 89 MG/DL
LYMPHOCYTES # BLD AUTO: 2.26 X10*3/UL (ref 1.2–4.8)
LYMPHOCYTES NFR BLD AUTO: 23.1 %
MCH RBC QN AUTO: 29.8 PG (ref 26–34)
MCHC RBC AUTO-ENTMCNC: 34 G/DL (ref 32–36)
MCV RBC AUTO: 87 FL (ref 80–100)
MONOCYTES # BLD AUTO: 0.58 X10*3/UL (ref 0.1–1)
MONOCYTES NFR BLD AUTO: 5.9 %
NEUTROPHILS # BLD AUTO: 6.59 X10*3/UL (ref 1.2–7.7)
NEUTROPHILS NFR BLD AUTO: 67.4 %
NON HDL CHOLESTEROL: 110 MG/DL (ref 0–149)
NRBC BLD-RTO: NORMAL /100{WBCS}
PLATELET # BLD AUTO: 280 X10*3/UL (ref 150–450)
POTASSIUM SERPL-SCNC: 4.4 MMOL/L (ref 3.5–5.3)
PROT SERPL-MCNC: 6.7 G/DL (ref 6.4–8.2)
RBC # BLD AUTO: 4.94 X10*6/UL (ref 4–5.2)
SODIUM SERPL-SCNC: 139 MMOL/L (ref 136–145)
TRIGL SERPL-MCNC: 102 MG/DL (ref 0–149)
TSH SERPL-ACNC: 2.31 MIU/L (ref 0.44–3.98)
VIT B12 SERPL-MCNC: 339 PG/ML (ref 211–911)
VLDL: 20 MG/DL (ref 0–40)
WBC # BLD AUTO: 9.8 X10*3/UL (ref 4.4–11.3)

## 2024-10-14 PROCEDURE — 83036 HEMOGLOBIN GLYCOSYLATED A1C: CPT

## 2024-10-14 PROCEDURE — 80061 LIPID PANEL: CPT

## 2024-10-14 PROCEDURE — 84443 ASSAY THYROID STIM HORMONE: CPT

## 2024-10-14 PROCEDURE — 85025 COMPLETE CBC W/AUTO DIFF WBC: CPT

## 2024-10-14 PROCEDURE — 80053 COMPREHEN METABOLIC PANEL: CPT

## 2024-10-14 PROCEDURE — 36415 COLL VENOUS BLD VENIPUNCTURE: CPT

## 2024-10-14 PROCEDURE — 82306 VITAMIN D 25 HYDROXY: CPT

## 2024-10-14 PROCEDURE — 82607 VITAMIN B-12: CPT

## 2024-11-08 PROCEDURE — RXMED WILLOW AMBULATORY MEDICATION CHARGE

## 2024-11-09 PROCEDURE — RXMED WILLOW AMBULATORY MEDICATION CHARGE

## 2024-11-13 ENCOUNTER — LAB (OUTPATIENT)
Dept: LAB | Facility: LAB | Age: 44
End: 2024-11-13
Payer: COMMERCIAL

## 2024-11-13 ENCOUNTER — PHARMACY VISIT (OUTPATIENT)
Dept: PHARMACY | Facility: CLINIC | Age: 44
End: 2024-11-13
Payer: COMMERCIAL

## 2024-11-13 DIAGNOSIS — Z12.11 ENCOUNTER FOR SCREENING FOR MALIGNANT NEOPLASM OF COLON: ICD-10-CM

## 2024-11-13 PROCEDURE — 82274 ASSAY TEST FOR BLOOD FECAL: CPT

## 2024-11-15 ENCOUNTER — TELEPHONE (OUTPATIENT)
Dept: PRIMARY CARE | Facility: CLINIC | Age: 44
End: 2024-11-15
Payer: COMMERCIAL

## 2024-11-15 ENCOUNTER — PATIENT MESSAGE (OUTPATIENT)
Dept: PRIMARY CARE | Facility: CLINIC | Age: 44
End: 2024-11-15
Payer: COMMERCIAL

## 2024-11-15 DIAGNOSIS — M54.12 CERVICAL RADICULAR PAIN: ICD-10-CM

## 2024-11-15 DIAGNOSIS — Z01.89 NEED FOR ASSESSMENT FOR SLEEP APNEA: Primary | ICD-10-CM

## 2024-11-15 LAB — HEMOCCULT STL QL IA: NEGATIVE

## 2024-11-15 NOTE — TELEPHONE ENCOUNTER
Patient calling would like a new order entered for her sleep study in facility, she was trying to scheduled and she was told hers has .

## 2024-11-16 RX ORDER — GABAPENTIN 300 MG/1
CAPSULE ORAL
Qty: 180 CAPSULE | Refills: 0 | Status: SHIPPED | OUTPATIENT
Start: 2024-11-16

## 2024-11-29 DIAGNOSIS — I10 HTN (HYPERTENSION), BENIGN: ICD-10-CM

## 2024-11-29 RX ORDER — ATENOLOL 25 MG/1
25 TABLET ORAL DAILY
Qty: 90 TABLET | Refills: 0 | Status: SHIPPED | OUTPATIENT
Start: 2024-11-29

## 2024-12-02 ENCOUNTER — PROCEDURE VISIT (OUTPATIENT)
Dept: SLEEP MEDICINE | Facility: CLINIC | Age: 44
End: 2024-12-02
Payer: COMMERCIAL

## 2024-12-02 DIAGNOSIS — Z01.89 NEED FOR ASSESSMENT FOR SLEEP APNEA: ICD-10-CM

## 2024-12-02 DIAGNOSIS — G47.33 OBSTRUCTIVE SLEEP APNEA (ADULT) (PEDIATRIC): ICD-10-CM

## 2024-12-02 PROCEDURE — 95810 POLYSOM 6/> YRS 4/> PARAM: CPT | Performed by: STUDENT IN AN ORGANIZED HEALTH CARE EDUCATION/TRAINING PROGRAM

## 2024-12-03 VITALS
HEIGHT: 65 IN | WEIGHT: 230.01 LBS | BODY MASS INDEX: 38.32 KG/M2 | SYSTOLIC BLOOD PRESSURE: 126 MMHG | DIASTOLIC BLOOD PRESSURE: 86 MMHG

## 2024-12-03 ASSESSMENT — SLEEP AND FATIGUE QUESTIONNAIRES
HOW LIKELY ARE YOU TO NOD OFF OR FALL ASLEEP IN A CAR, WHILE STOPPED FOR A FEW MINUTES IN TRAFFIC: WOULD NEVER DOZE
HOW LIKELY ARE YOU TO NOD OFF OR FALL ASLEEP WHILE SITTING AND TALKING TO SOMEONE: WOULD NEVER DOZE
ESS-CHAD TOTAL SCORE: 6
SITING INACTIVE IN A PUBLIC PLACE LIKE A CLASS ROOM OR A MOVIE THEATER: WOULD NEVER DOZE
HOW LIKELY ARE YOU TO NOD OFF OR FALL ASLEEP WHILE LYING DOWN TO REST IN THE AFTERNOON WHEN CIRCUMSTANCES PERMIT: SLIGHT CHANCE OF DOZING
HOW LIKELY ARE YOU TO NOD OFF OR FALL ASLEEP WHEN YOU ARE A PASSENGER IN A CAR FOR AN HOUR WITHOUT A BREAK: SLIGHT CHANCE OF DOZING
HOW LIKELY ARE YOU TO NOD OFF OR FALL ASLEEP WHILE WATCHING TV: MODERATE CHANCE OF DOZING
HOW LIKELY ARE YOU TO NOD OFF OR FALL ASLEEP WHILE SITTING QUIETLY AFTER LUNCH WITHOUT ALCOHOL: WOULD NEVER DOZE
HOW LIKELY ARE YOU TO NOD OFF OR FALL ASLEEP WHILE SITTING AND READING: MODERATE CHANCE OF DOZING

## 2024-12-03 NOTE — PROGRESS NOTES
[facility] TECH NOTE:     Patient: Deja Hazel   MRN//AGE: [id number]  1980  44 y.o.   Technologist: Kalpana Monet   Room: 1   Service Date: 2024        Sleep Testing Location: [facility]:     TECHNOLOGIST SLEEP STUDY PROCEDURE NOTE:   This sleep study is being conducted according to the policies and procedures outlined by the [employer] accreditation standards.  The sleep study procedure and processes involved during this appointment was explained to the patient/patient’s family, questions were answered. The patient/family verbalized understanding.      The patient is a 44 y.o. year old female scheduled for aDiagnostic PSG Split night with montage of:  Diagnostic PSG Split night . she arrived for her appointment.      The study that was ultimately completed was a Diagnostic PSG  with montage of:  Diagnostic PSG .    The full study Was completed.  Patient questionnaires completed?: yes     Consents signed? yes    Initial Fall Risk Screening:     Deja has not fallen in the last 6 months. her did not result in injury. Deja does not have a fear of falling. He does not need assistance with sitting, standing, or walking. she does not need assistance walking in her home. she does not need assistance in an unfamiliar setting. The patient is notusing an assistive device.     Brief Study observations: All sensors applied.  Patient did not qualify for ordered split. Full study completed.      Deviation to order/protocol and reason: None      Other:None    After the procedure, the patient/family was informed to ensure followup with ordering clinician for testing results.      Technologist: SELIN Lima

## 2024-12-04 ENCOUNTER — APPOINTMENT (OUTPATIENT)
Dept: PRIMARY CARE | Facility: CLINIC | Age: 44
End: 2024-12-04
Payer: COMMERCIAL

## 2024-12-04 VITALS
TEMPERATURE: 98.1 F | HEIGHT: 65 IN | SYSTOLIC BLOOD PRESSURE: 112 MMHG | HEART RATE: 60 BPM | WEIGHT: 243 LBS | OXYGEN SATURATION: 95 % | DIASTOLIC BLOOD PRESSURE: 74 MMHG | BODY MASS INDEX: 40.48 KG/M2

## 2024-12-04 DIAGNOSIS — E66.813 CLASS 3 SEVERE OBESITY DUE TO EXCESS CALORIES WITH SERIOUS COMORBIDITY AND BODY MASS INDEX (BMI) OF 40.0 TO 44.9 IN ADULT: Primary | ICD-10-CM

## 2024-12-04 DIAGNOSIS — M54.12 CERVICAL RADICULAR PAIN: ICD-10-CM

## 2024-12-04 DIAGNOSIS — M54.12 RIGHT CERVICAL RADICULOPATHY: ICD-10-CM

## 2024-12-04 DIAGNOSIS — E66.01 CLASS 3 SEVERE OBESITY DUE TO EXCESS CALORIES WITH SERIOUS COMORBIDITY AND BODY MASS INDEX (BMI) OF 40.0 TO 44.9 IN ADULT: Primary | ICD-10-CM

## 2024-12-04 DIAGNOSIS — Z23 NEED FOR PNEUMOCOCCAL 20-VALENT CONJUGATE VACCINATION: ICD-10-CM

## 2024-12-04 DIAGNOSIS — G89.29 CHRONIC BILATERAL LOW BACK PAIN WITH RIGHT-SIDED SCIATICA: ICD-10-CM

## 2024-12-04 DIAGNOSIS — M54.41 CHRONIC BILATERAL LOW BACK PAIN WITH RIGHT-SIDED SCIATICA: ICD-10-CM

## 2024-12-04 PROBLEM — L30.9 ECZEMA: Status: RESOLVED | Noted: 2023-08-17 | Resolved: 2024-12-04

## 2024-12-04 PROBLEM — R73.09 ABNORMAL GLUCOSE LEVEL: Status: RESOLVED | Noted: 2023-08-17 | Resolved: 2024-12-04

## 2024-12-04 PROBLEM — R10.30 GROIN PAIN: Status: RESOLVED | Noted: 2023-08-17 | Resolved: 2024-12-04

## 2024-12-04 PROBLEM — L84 CALLUS: Status: RESOLVED | Noted: 2023-03-06 | Resolved: 2024-12-04

## 2024-12-04 PROBLEM — R79.0 ABNORMAL IRON SATURATION: Status: RESOLVED | Noted: 2023-08-17 | Resolved: 2024-12-04

## 2024-12-04 PROBLEM — L85.8 OTHER SPECIFIED EPIDERMAL THICKENING: Status: RESOLVED | Noted: 2023-03-06 | Resolved: 2024-12-04

## 2024-12-04 PROCEDURE — 3008F BODY MASS INDEX DOCD: CPT | Performed by: FAMILY MEDICINE

## 2024-12-04 PROCEDURE — 90677 PCV20 VACCINE IM: CPT | Performed by: FAMILY MEDICINE

## 2024-12-04 PROCEDURE — 99215 OFFICE O/P EST HI 40 MIN: CPT | Performed by: FAMILY MEDICINE

## 2024-12-04 PROCEDURE — 90471 IMMUNIZATION ADMIN: CPT | Performed by: FAMILY MEDICINE

## 2024-12-04 PROCEDURE — RXMED WILLOW AMBULATORY MEDICATION CHARGE

## 2024-12-04 PROCEDURE — 1036F TOBACCO NON-USER: CPT | Performed by: FAMILY MEDICINE

## 2024-12-04 RX ORDER — PREGABALIN 75 MG/1
75 CAPSULE ORAL NIGHTLY
Qty: 30 CAPSULE | Refills: 0 | Status: SHIPPED | OUTPATIENT
Start: 2024-12-04

## 2024-12-04 ASSESSMENT — ENCOUNTER SYMPTOMS
BRUISES/BLEEDS EASILY: 0
ARTHRALGIAS: 1
SORE THROAT: 0
OCCASIONAL FEELINGS OF UNSTEADINESS: 0
EYE ITCHING: 0
FLANK PAIN: 0
EYE DISCHARGE: 0
RHINORRHEA: 0
FEVER: 0
NECK STIFFNESS: 1
ABDOMINAL PAIN: 0
NECK PAIN: 1
SHORTNESS OF BREATH: 0
MYALGIAS: 1
CONSTIPATION: 0
VOICE CHANGE: 0
DEPRESSION: 0
COUGH: 0
CHEST TIGHTNESS: 0
LOSS OF SENSATION IN FEET: 0
WEAKNESS: 1
SINUS PRESSURE: 0
NERVOUS/ANXIOUS: 0
COLOR CHANGE: 0
HEMATURIA: 0
UNEXPECTED WEIGHT CHANGE: 0
EYE REDNESS: 0
JOINT SWELLING: 0
APPETITE CHANGE: 0
DYSURIA: 0
ADENOPATHY: 0
EYE PAIN: 0
BLOOD IN STOOL: 0
ACTIVITY CHANGE: 0
WHEEZING: 0
AGITATION: 0
DIZZINESS: 0
WOUND: 0
DIARRHEA: 0
PALPITATIONS: 0
BACK PAIN: 1
FATIGUE: 0
SLEEP DISTURBANCE: 0
FREQUENCY: 0
POLYDIPSIA: 0
TROUBLE SWALLOWING: 0
NUMBNESS: 1
SINUS PAIN: 0
CHILLS: 0
POLYPHAGIA: 0
VOMITING: 0
NAUSEA: 0
FACIAL SWELLING: 0
DIAPHORESIS: 0

## 2024-12-04 ASSESSMENT — PATIENT HEALTH QUESTIONNAIRE - PHQ9
1. LITTLE INTEREST OR PLEASURE IN DOING THINGS: NOT AT ALL
2. FEELING DOWN, DEPRESSED OR HOPELESS: NOT AT ALL
SUM OF ALL RESPONSES TO PHQ9 QUESTIONS 1 AND 2: 0

## 2024-12-04 NOTE — PATIENT INSTRUCTIONS
Will fill out FMLA forms for patient to be able to sit at work and also time off in case her back flares up.  Will consider compounded weight loss medications versus Qsymia.  Information was given for both and she will think about it and also would like to discuss it with our pharmacist to see the options that she has.  She will call back for a prescription.  Continue to work on her weight with diet and exercise.  Stop gabapentin and start pregabalin for lumbar radiculopathy and cervical radiculopathy.  Encouraged her to go back to physical therapy and may need to repeat MRI or x-rays.  Consider sending patient to spine specialist.  To continue all other medications for now.  Follow-up in 1 month for weight loss and follow-up on chronic pain medications.  Pneumonia vaccine today.  A1c was reviewed and fasting blood work ordered for next visit.

## 2024-12-04 NOTE — PROGRESS NOTES
Subjective   Patient ID: Deja Hazel is a 44 y.o. female who presents for Follow-up (3 month follow up.).  Today she is accompanied by alone.     HPI  Subjective:   Deja Hazel is a 44 y.o. female with hypertension.  Current Outpatient Medications   Medication Sig Dispense Refill    albuterol (Ventolin HFA) 90 mcg/actuation inhaler Inhale 1-2 puffs every 4 to 6 hours as needed for wheezing. 18 g 0    atenolol (Tenormin) 25 mg tablet Take 1 tablet (25 mg) by mouth once daily. 90 tablet 0    celecoxib (CeleBREX) 200 mg capsule Take 1 capsule (200 mg) by mouth once daily. 90 capsule 0    fluocinonide (Lidex) 0.05 % external solution Apply to affected areas of active dermatitis in the scalp twice daily as needed. Use less than 14 days per month. 180 mL 1    ketoconazole (NIZOral) 2 % cream Apply to affected areas twice daily when active. Once controlled, apply once daily for maintenance. 180 g 1    levonorgestrel (Mirena) 21 mcg/24 hours (8 yrs) 52 mg IUD by intrauterine route.      loratadine (Claritin) 10 mg tablet Take 1 tablet (10 mg) by mouth once daily. 90 tablet 1    montelukast (Singulair) 10 mg tablet Take 1 tablet (10 mg) by mouth once daily. 90 tablet 1    ruxolitinib (Opzelura) 1.5 % cream APPLY ONE (1) APPLICATION TOPICALLY TWICE DAILY TO ACTIVE AREAS OF ECZEMA FOR UP TO 8 WEEKS AT A TIME. 180 g 1    urea (Carmol) 40 % cream Apply to affected areas of thickened skin once daily as tolerated. 255 g 1    pregabalin (Lyrica) 75 mg capsule Take 1 capsule (75 mg) by mouth once daily at bedtime. 30 capsule 0     No current facility-administered medications for this visit.      Hypertension ROS: taking medications as instructed, no medication side effects noted, no TIA's, no chest pain on exertion, no dyspnea on exertion, no swelling of ankles, no orthostatic dizziness or lightheadedness, no orthopnea or paroxysmal nocturnal dyspnea, and no palpitations.     Back Pain  This is a new problem. The current  episode started several months ago. The problem has been gradually worsening since onset. The pain is present in the lumbar spine and sacro-iliac. The quality of the pain is described as aching, burning and shooting. The pain radiates to the right thigh and left thigh. The pain is at a severity of 5/10. The pain is moderate. The pain is The same all the time. The symptoms are aggravated by stress, lying down, position, sitting, standing, twisting, coughing and bending. Stiffness is present All day. Associated symptoms include leg pain, numbness, paresis, paresthesias, pelvic pain, tingling and weakness. Pertinent negatives include no abdominal pain, bladder incontinence, bowel incontinence, chest pain, dysuria, fever, headaches, perianal numbness or weight loss. Risk factors include obesity and menopause. She has tried bed rest, heat, home exercises, ice, chiropractic manipulation, NSAIDs and walking for the symptoms. The treatment provided no relief.   Has been in PT but was not able to get the therapy again    Review of Systems   Constitutional:  Negative for activity change, appetite change, chills, diaphoresis, fatigue, fever and unexpected weight change.   HENT:  Negative for congestion, dental problem, drooling, ear discharge, ear pain, facial swelling, hearing loss, nosebleeds, postnasal drip, rhinorrhea, sinus pressure, sinus pain, sneezing, sore throat, tinnitus, trouble swallowing and voice change.    Eyes:  Negative for pain, discharge, redness, itching and visual disturbance.   Respiratory:  Negative for cough, chest tightness, shortness of breath and wheezing.    Cardiovascular:  Negative for chest pain, palpitations and leg swelling.   Gastrointestinal:  Negative for abdominal pain, blood in stool, constipation, diarrhea, nausea and vomiting.   Endocrine: Negative for cold intolerance, heat intolerance, polydipsia, polyphagia and polyuria.   Genitourinary:  Negative for decreased urine volume, dysuria,  "flank pain, frequency, hematuria and urgency.   Musculoskeletal:  Positive for arthralgias, back pain, gait problem, myalgias, neck pain and neck stiffness. Negative for joint swelling.   Skin:  Negative for color change, pallor, rash and wound.   Neurological:  Positive for weakness and numbness. Negative for dizziness.   Hematological:  Negative for adenopathy. Does not bruise/bleed easily.   Psychiatric/Behavioral:  Negative for agitation, behavioral problems and sleep disturbance. The patient is not nervous/anxious.        Objective   /74   Pulse 60   Temp 36.7 °C (98.1 °F) (Oral)   Ht 1.651 m (5' 5\")   Wt 110 kg (243 lb)   SpO2 95%   BMI 40.44 kg/m²   BSA: 2.25 meters squared  Growth percentiles: Facility age limit for growth %nandini is 20 years. Facility age limit for growth %nandini is 20 years.     Physical Exam  Vitals and nursing note reviewed.   Constitutional:       General: She is not in acute distress.     Appearance: Normal appearance. She is obese. She is not ill-appearing.   HENT:      Head: Normocephalic.      Right Ear: External ear normal.      Left Ear: External ear normal.      Nose: Nose normal.   Eyes:      Conjunctiva/sclera: Conjunctivae normal.   Cardiovascular:      Rate and Rhythm: Normal rate and regular rhythm.      Pulses: Normal pulses.      Heart sounds: Normal heart sounds. No murmur heard.     No friction rub. No gallop.   Pulmonary:      Effort: Pulmonary effort is normal. No respiratory distress.      Breath sounds: Normal breath sounds. No stridor. No wheezing, rhonchi or rales.   Chest:      Chest wall: No tenderness.   Musculoskeletal:         General: Tenderness present. No swelling, deformity or signs of injury.      Cervical back: Neck supple. Spasms and tenderness present. No crepitus. Pain with movement present. Decreased range of motion.      Thoracic back: Normal. No swelling, edema, deformity, signs of trauma, lacerations, spasms, tenderness or bony tenderness. " Normal range of motion. No scoliosis.      Lumbar back: Spasms and tenderness present. No swelling, edema, deformity, signs of trauma, lacerations or bony tenderness. Decreased range of motion. Positive right straight leg raise test. Negative left straight leg raise test. No scoliosis.      Right lower leg: Normal. No edema.      Left lower leg: Normal. No edema.      Right ankle:      Right Achilles Tendon: Normal.      Left ankle:      Left Achilles Tendon: Normal.   Skin:     General: Skin is warm.   Neurological:      General: No focal deficit present.      Mental Status: She is alert and oriented to person, place, and time.      Sensory: No sensory deficit.      Motor: No weakness.      Gait: Gait normal.      Deep Tendon Reflexes: Reflexes normal.   Psychiatric:         Mood and Affect: Mood normal.         Behavior: Behavior normal.         Thought Content: Thought content normal.         Judgment: Judgment normal.         Assessment/Plan   Problem List Items Addressed This Visit             ICD-10-CM    Right cervical radiculopathy M54.12    Relevant Medications    pregabalin (Lyrica) 75 mg capsule    Class 3 severe obesity due to excess calories with serious comorbidity and body mass index (BMI) of 40.0 to 44.9 in adult - Primary E66.813, E66.01, Z68.41     Obesity with BMI and comorbidities as noted above.   1. Discussed proper diet (low fat, low sodium, high fiber) with patient.   2. Discussed need for regular exercise (3 times per week, 20 minutes per session) with patient.            Relevant Orders    Referral to Clinical Pharmacy    Chronic bilateral low back pain with right-sided sciatica M54.41, G89.29    Relevant Medications    pregabalin (Lyrica) 75 mg capsule    Cervical radicular pain M54.12    Relevant Medications    pregabalin (Lyrica) 75 mg capsule     Other Visit Diagnoses         Codes    Need for pneumococcal 20-valent conjugate vaccination     Z23    Relevant Orders    Pneumococcal  conjugate vaccine, 20-valent (PREVNAR 20) (Completed)            Current Outpatient Medications   Medication Sig Dispense Refill    albuterol (Ventolin HFA) 90 mcg/actuation inhaler Inhale 1-2 puffs every 4 to 6 hours as needed for wheezing. 18 g 0    atenolol (Tenormin) 25 mg tablet Take 1 tablet (25 mg) by mouth once daily. 90 tablet 0    celecoxib (CeleBREX) 200 mg capsule Take 1 capsule (200 mg) by mouth once daily. 90 capsule 0    fluocinonide (Lidex) 0.05 % external solution Apply to affected areas of active dermatitis in the scalp twice daily as needed. Use less than 14 days per month. 180 mL 1    ketoconazole (NIZOral) 2 % cream Apply to affected areas twice daily when active. Once controlled, apply once daily for maintenance. 180 g 1    levonorgestrel (Mirena) 21 mcg/24 hours (8 yrs) 52 mg IUD by intrauterine route.      loratadine (Claritin) 10 mg tablet Take 1 tablet (10 mg) by mouth once daily. 90 tablet 1    montelukast (Singulair) 10 mg tablet Take 1 tablet (10 mg) by mouth once daily. 90 tablet 1    ruxolitinib (Opzelura) 1.5 % cream APPLY ONE (1) APPLICATION TOPICALLY TWICE DAILY TO ACTIVE AREAS OF ECZEMA FOR UP TO 8 WEEKS AT A TIME. 180 g 1    urea (Carmol) 40 % cream Apply to affected areas of thickened skin once daily as tolerated. 255 g 1    pregabalin (Lyrica) 75 mg capsule Take 1 capsule (75 mg) by mouth once daily at bedtime. 30 capsule 0     No current facility-administered medications for this visit.     Will fill out FMLA forms for patient to be able to sit at work and also time off in case her back flares up.  Will consider compounded weight loss medications versus Qsymia.  Information was given for both and she will think about it and also would like to discuss it with our pharmacist to see the options that she has.  She will call back for a prescription.  Continue to work on her weight with diet and exercise.  Stop gabapentin and start pregabalin for lumbar radiculopathy and cervical  radiculopathy.  Encouraged her to go back to physical therapy and may need to repeat MRI or x-rays.  Consider sending patient to spine specialist.  To continue all other medications for now.  Follow-up in 1 month for weight loss and follow-up on chronic pain medications.  Pneumonia vaccine today.  A1c was reviewed and fasting blood work ordered for next visit.    I personally spent over 50% of a total 55 minutes in counseling and discussion with the patient and coordination of care as described above.

## 2024-12-06 ENCOUNTER — PHARMACY VISIT (OUTPATIENT)
Dept: PHARMACY | Facility: CLINIC | Age: 44
End: 2024-12-06
Payer: COMMERCIAL

## 2024-12-09 DIAGNOSIS — J45.20 MILD INTERMITTENT ASTHMA WITHOUT COMPLICATION (HHS-HCC): ICD-10-CM

## 2024-12-10 ENCOUNTER — TELEMEDICINE (OUTPATIENT)
Dept: PHARMACY | Facility: HOSPITAL | Age: 44
End: 2024-12-10
Payer: COMMERCIAL

## 2024-12-10 DIAGNOSIS — E66.813 CLASS 3 SEVERE OBESITY DUE TO EXCESS CALORIES WITH SERIOUS COMORBIDITY AND BODY MASS INDEX (BMI) OF 40.0 TO 44.9 IN ADULT: ICD-10-CM

## 2024-12-10 DIAGNOSIS — E66.01 CLASS 3 SEVERE OBESITY DUE TO EXCESS CALORIES WITH SERIOUS COMORBIDITY AND BODY MASS INDEX (BMI) OF 40.0 TO 44.9 IN ADULT: ICD-10-CM

## 2024-12-10 RX ORDER — LORATADINE 10 MG/1
10 TABLET ORAL DAILY
Qty: 90 TABLET | Refills: 1 | Status: SHIPPED | OUTPATIENT
Start: 2024-12-10

## 2024-12-10 NOTE — RESULT ENCOUNTER NOTE
Has mild obstructive sleep apnea.  Does not require treatment unless she would like to consider it.  Other options are weight loss and better sleep hygiene

## 2024-12-16 ENCOUNTER — TREATMENT (OUTPATIENT)
Dept: PHYSICAL THERAPY | Facility: CLINIC | Age: 44
End: 2024-12-16
Payer: COMMERCIAL

## 2024-12-16 DIAGNOSIS — G89.29 CHRONIC LOW BACK PAIN, UNSPECIFIED BACK PAIN LATERALITY, UNSPECIFIED WHETHER SCIATICA PRESENT: ICD-10-CM

## 2024-12-16 DIAGNOSIS — M54.50 CHRONIC LOW BACK PAIN, UNSPECIFIED BACK PAIN LATERALITY, UNSPECIFIED WHETHER SCIATICA PRESENT: ICD-10-CM

## 2024-12-16 DIAGNOSIS — M54.12 CERVICAL RADICULAR PAIN: ICD-10-CM

## 2024-12-16 PROCEDURE — 97110 THERAPEUTIC EXERCISES: CPT | Mod: GP | Performed by: PHYSICAL THERAPIST

## 2024-12-16 ASSESSMENT — PAIN SCALES - GENERAL: PAINLEVEL_OUTOF10: 6

## 2024-12-16 ASSESSMENT — PAIN - FUNCTIONAL ASSESSMENT: PAIN_FUNCTIONAL_ASSESSMENT: 0-10

## 2024-12-17 PROCEDURE — RXMED WILLOW AMBULATORY MEDICATION CHARGE

## 2024-12-17 NOTE — ASSESSMENT & PLAN NOTE
Patient was referred for initiation of a GLP-1 agonist for weight loss.  Discussed options available including Wegovy, Saxenda, and Zepbound.  Patient's insurance does not cover weight loss injections, however the patient is eligible for both the copay card and  patient assistance.      Zepbound Education:     - Counseled patient on MOA, expectations, side effects, duration of therapy, contraindications, administration, and monitoring parameters  - Answered all patient questions and concerns  - Counseled patient on Zepbound MOA, expectations, side effects, duration of therapy, administration, and monitoring parameters.  - Provided detailed dosing and administration counseling to ensure proper technique.   - Reviewed Zepbound titration schedule, starting with 2.5 mg once weekly to a goal of 15 mg once weekly if tolerated  - Counseled patient on the benefits of GLP-1ra glycemic control and weight loss  - Reviewed storage requirements of Zepbound when not in use, and when to administer the medication if a dose is missed.  - Advised patient that they may experience improved satiety after meals and portion sizes of meals may be reduced as doses of Zepbound increase.    PLAN:   - Start Zepbound 2.5 mg once weekly injection.  Prescription sent to [facility] pharmacy.

## 2024-12-17 NOTE — PROGRESS NOTES
Clinical Pharmacy Appointment    Patient ID: Deja Hazel is a 44 y.o. female who presents for Weight Loss.    Pt is here for First appointment.     Referring Provider: Jamila Escobar MD  PCP: Jamila Escobar MD   Last visit with PCP: 12/4/2024   Next visit with PCP: 1/14/2025      Subjective     Interval History      HPI  Weight Loss  Starting Weight: 243 lbs   Starting BMI: 40.44 kg/m²     Current Weight: 243 lbs   Current BMI: 40.44 kg/m²     Comorbid Conditions:  HTN: No  Cholesterol: No      The 10-year ASCVD risk score (Aureliano MACHUCA, et al., 2019) is: 0.7%    Values used to calculate the score:      Age: 44 years      Sex: Female      Is Non- : No      Diabetic: No      Tobacco smoker: No      Systolic Blood Pressure: 112 mmHg      Is BP treated: Yes      HDL Cholesterol: 52.2 mg/dL      Total Cholesterol: 162 mg/dL     Drug Interactions  No relevant drug interactions were noted.    Medication System Management  Patient's preferred pharmacy:  [facility]   Adherence/Organization: None   Affordability/Accessibility: None     Patient Assistance Screening (VAF)    Patient verbally reports monthly or yearly income which is less than 400% federal poverty level     Application for program has been submitted for the following medications: Zepbound    Patient has been informed that program team will be reaching out to them to discuss necessary documentation, instructed to answer phone/return voicemail.     Patient aware this process may take up to 6 weeks.     If approved medication must be filled through [facility] pharmacy and may be picked up or mailed to patient.         Objective   Allergies   Allergen Reactions    Adhesive Tape-Silicones Itching    Augmentin [Amoxicillin-Pot Clavulanate] Unknown    Covid-19 Vaccine, Mrna, [id number], Lnp-S (Pfizer) Unknown    Penicillins Unknown    Salicylates Other    Latex Hives, Itching and Rash    Prednisone Diarrhea, Hives, Itching and Rash      Other reaction(s): GI Upset     Social History     Social History Narrative    Not on file      Medication Review  Current Outpatient Medications   Medication Instructions    albuterol (Ventolin HFA) 90 mcg/actuation inhaler Inhale 1-2 puffs every 4 to 6 hours as needed for wheezing.    atenolol (TENORMIN) 25 mg, oral, Daily    celecoxib (CELEBREX) 200 mg, oral, Daily    fluocinonide (Lidex) 0.05 % external solution Apply to affected areas of active dermatitis in the scalp twice daily as needed. Use less than 14 days per month.    ketoconazole (NIZOral) 2 % cream Apply to affected areas twice daily when active. Once controlled, apply once daily for maintenance.    levonorgestrel (Mirena) 21 mcg/24 hours (8 yrs) 52 mg IUD by intrauterine route.    loratadine (CLARITIN) 10 mg, oral, Daily    montelukast (SINGULAIR) 10 mg, oral, Daily    pregabalin (LYRICA) 75 mg, oral, Nightly    ruxolitinib (Opzelura) 1.5 % cream APPLY ONE (1) APPLICATION TOPICALLY TWICE DAILY TO ACTIVE AREAS OF ECZEMA FOR UP TO 8 WEEKS AT A TIME.    tirzepatide (weight loss) (ZEPBOUND) 2.5 mg, subcutaneous, Every 7 days    urea (Carmol) 40 % cream Apply to affected areas of thickened skin once daily as tolerated.      Vitals  BP Readings from Last 2 Encounters:   12/04/24 112/74   12/03/24 126/86     BMI Readings from Last 1 Encounters:   12/04/24 40.44 kg/m²      Labs  A1C  Lab Results   Component Value Date    HGBA1C 5.0 10/14/2024    HGBA1C 5.5 07/14/2023    HGBA1C 5.5 10/15/2022     BMP  Lab Results   Component Value Date    CALCIUM 9.0 10/14/2024     10/14/2024    K 4.4 10/14/2024    CO2 26 10/14/2024     10/14/2024    BUN 12 10/14/2024    CREATININE 0.54 10/14/2024    EGFR >90 10/14/2024     LFTs  Lab Results   Component Value Date    ALT 18 10/14/2024    AST 13 10/14/2024    ALKPHOS 72 10/14/2024    BILITOT 0.4 10/14/2024     FLP  Lab Results   Component Value Date    TRIG 102 10/14/2024    CHOL 162 10/14/2024    LDLF 84  "11/07/2022    LDLCALC 89 10/14/2024    HDL 52.2 10/14/2024     Urine Microalbumin  No results found for: \"MICROALBCREA\"  Weight Management  Wt Readings from Last 3 Encounters:   12/04/24 110 kg (243 lb)   12/03/24 104 kg (230 lb 0.1 oz)   08/29/24 108 kg (239 lb)      There is no height or weight on file to calculate BMI.     Assessment/Plan   Problem List Items Addressed This Visit       Class 3 severe obesity due to excess calories with serious comorbidity and body mass index (BMI) of 40.0 to 44.9 in adult     Patient was referred for initiation of a GLP-1 agonist for weight loss.  Discussed options available including Wegovy, Saxenda, and Zepbound.  Patient's insurance does not cover weight loss injections, however the patient is eligible for both the copay card and  patient assistance.      Zepbound Education:     - Counseled patient on MOA, expectations, side effects, duration of therapy, contraindications, administration, and monitoring parameters  - Answered all patient questions and concerns  - Counseled patient on Zepbound MOA, expectations, side effects, duration of therapy, administration, and monitoring parameters.  - Provided detailed dosing and administration counseling to ensure proper technique.   - Reviewed Zepbound titration schedule, starting with 2.5 mg once weekly to a goal of 15 mg once weekly if tolerated  - Counseled patient on the benefits of GLP-1ra glycemic control and weight loss  - Reviewed storage requirements of Zepbound when not in use, and when to administer the medication if a dose is missed.  - Advised patient that they may experience improved satiety after meals and portion sizes of meals may be reduced as doses of Zepbound increase.    PLAN:   - Start Zepbound 2.5 mg once weekly injection.  [facility]              Relevant Medications    tirzepatide, weight loss, (Zepbound) 2.5 mg/0.5 mL injection    Other Relevant Orders    Referral to Clinical " Pharmacy     Clinical Pharmacist follow-up: 4 weeks , Telehealth visit    Continue all meds under the continuation of care with the referring provider and clinical pharmacy team.    Thank you,  Carolina Augustin, PharmD   Clinical Pharmacist  [phone]    Verbal consent to manage patient's drug therapy was obtained from the patient. They were informed they may decline to participate or withdraw from participation in pharmacy services at any time.

## 2024-12-18 ENCOUNTER — PHARMACY VISIT (OUTPATIENT)
Dept: PHARMACY | Facility: CLINIC | Age: 44
End: 2024-12-18
Payer: COMMERCIAL

## 2024-12-19 ENCOUNTER — APPOINTMENT (OUTPATIENT)
Dept: DERMATOLOGY | Facility: CLINIC | Age: 44
End: 2024-12-19
Payer: COMMERCIAL

## 2024-12-19 DIAGNOSIS — D22.9 MELANOCYTIC NEVUS, UNSPECIFIED LOCATION: Primary | ICD-10-CM

## 2024-12-19 DIAGNOSIS — D18.01 HEMANGIOMA OF SKIN: ICD-10-CM

## 2024-12-19 DIAGNOSIS — D17.22 LIPOMA OF LEFT UPPER EXTREMITY: ICD-10-CM

## 2024-12-19 DIAGNOSIS — L81.4 LENTIGO: ICD-10-CM

## 2024-12-19 DIAGNOSIS — R52 PAIN: ICD-10-CM

## 2024-12-19 DIAGNOSIS — Z12.83 ENCOUNTER FOR SCREENING FOR MALIGNANT NEOPLASM OF SKIN: ICD-10-CM

## 2024-12-19 PROCEDURE — 1036F TOBACCO NON-USER: CPT | Performed by: NURSE PRACTITIONER

## 2024-12-19 PROCEDURE — 99213 OFFICE O/P EST LOW 20 MIN: CPT | Performed by: NURSE PRACTITIONER

## 2024-12-20 NOTE — PROGRESS NOTES
Subjective     Deja Hazel is a 44 y.o. female who presents for the following: Skin Check (Presents for FBSE. Pt reports family hx of skin cancer. Chaperone offered and declined. ).     Review of Systems:  No other skin or systemic complaints other than what is documented elsewhere in the note.    The following portions of the chart were reviewed this encounter and updated as appropriate:  Tobacco  Allergies  Meds  Problems  Med Hx  Surg Hx  Fam Hx         Skin Cancer History  No skin cancer on file.      Specialty Problems          Dermatology Problems    Nummular dermatitis        Objective   Well appearing patient in no apparent distress; mood and affect are within normal limits.    A full examination was performed including scalp, head, eyes, ears, nose, lips, neck, chest, axillae, abdomen, back, buttocks, bilateral upper extremities, bilateral lower extremities, hands, feet, fingers, toes, fingernails, and toenails. All findings within normal limits unless otherwise noted below.    Assessment/Plan   1. Melanocytic nevus, unspecified location  Uniform pigmented macule(s)/papule(s) with reassuring findings on dermoscopy    -Discussed nature of condition  -Reassurance, benign-appearing features on examination today  -Recommend continued observation    2. Encounter for screening for malignant neoplasm of skin  Scattered benign lesions    - Protective measures, such as avoiding skin exposure to sunlight during peak sun hours (10 AM to 3 PM), wearing protective clothing, and applying high-SPF sunscreen, are essential for reducing exposure to harmful ultraviolet (UV) light.  - Monthly self-examination of the skin is helpful to detect new lesions or changes in existing lesions.  - Discussed signs and symptoms of sun-related skin cancers.   - Make sure your moles are not signs of skin cancer (melanoma). Remember the ABCDEs of melanoma lesions:  A - Asymmetry: One half of the lesion does not mirror the other  half.  B - Border: The borders are irregular or vague (indistinct).  C - Color: More than one color may be noted within the mole.  D - Diameter: Size greater than 6 mm (roughly the size of a pencil eraser) may be concerning.  E - Evolving: Notable changes in the lesion over time are suspicious signs for skin cancer.    Related Procedures  Follow Up In Dermatology - Established Patient    3. Hemangioma of skin  Violaceous/red papule with maroon lagoons     - A cherry hemangioma is a small macule (small, flat, smooth area) or papule (small, solid bump) formed from an overgrowth of tiny blood vessels in the skin. Cherry hemangiomas are characteristically red or purplish in color. They often first appear in middle adulthood and usually increase in number with age. Cherry hemangiomas are noncancerous (benign) and are common in adults.  - Lesions are benign, reassured patient.     4. Lentigo  Scattered tan macules in sun-exposed areas.    A solar lentigo (plural, solar lentigines), sometimes called an age spot or liver spot, is a brown macule (small, flat, smooth area of skin) caused by chronic sun or artificial ultraviolet (UV) light exposure. There may be just one lentigo or there may be multiple. This type of lentigo is different from lentigo simplex (discussed separately) because it is caused by exposure to UV light. Solar lentigines are benign, but they do indicate excessive sun exposure, a risk factor for the development of skin cancer.  Lesions are benign, no treatment needed.     5. Lipoma of left upper extremity  Left Forearm - Posterior  1.5 cm semi-mobile dermal tumor with normal overlying skin    - Discussed the diagnosis with patient  - If approved by insurance, the office will call you to schedule and excision.     6. Pain    Follow up as scheduled with Dr. Alarcon. Please call me if there are any changes or development of concerning symptoms (lesion/skin condition is changing, bleeding, enlarging, or  worsening).

## 2024-12-21 ENCOUNTER — TELEPHONE (OUTPATIENT)
Dept: PRIMARY CARE | Facility: CLINIC | Age: 44
End: 2024-12-21
Payer: COMMERCIAL

## 2024-12-21 DIAGNOSIS — I10 HTN (HYPERTENSION), BENIGN: ICD-10-CM

## 2024-12-21 RX ORDER — ATENOLOL 25 MG/1
25 TABLET ORAL DAILY
Qty: 90 TABLET | Refills: 0 | Status: SHIPPED | OUTPATIENT
Start: 2024-12-21

## 2024-12-21 NOTE — TELEPHONE ENCOUNTER
Pt needs a refill on   atenolol (Tenormin) 25 mg tablet   [facility]    [facility] [id number] [phone]

## 2024-12-26 DIAGNOSIS — G89.29 CHRONIC BILATERAL LOW BACK PAIN WITH RIGHT-SIDED SCIATICA: ICD-10-CM

## 2024-12-26 DIAGNOSIS — M54.12 RIGHT CERVICAL RADICULOPATHY: ICD-10-CM

## 2024-12-26 DIAGNOSIS — M54.41 CHRONIC BILATERAL LOW BACK PAIN WITH RIGHT-SIDED SCIATICA: ICD-10-CM

## 2024-12-26 DIAGNOSIS — M54.12 CERVICAL RADICULAR PAIN: ICD-10-CM

## 2024-12-26 PROCEDURE — RXMED WILLOW AMBULATORY MEDICATION CHARGE

## 2024-12-26 RX ORDER — CELECOXIB 200 MG/1
200 CAPSULE ORAL DAILY
Qty: 90 CAPSULE | Refills: 0 | Status: SHIPPED | OUTPATIENT
Start: 2024-12-26

## 2024-12-26 RX ORDER — PREGABALIN 75 MG/1
75 CAPSULE ORAL NIGHTLY
Qty: 30 CAPSULE | Refills: 0 | Status: SHIPPED | OUTPATIENT
Start: 2024-12-26

## 2024-12-27 ENCOUNTER — PHARMACY VISIT (OUTPATIENT)
Dept: PHARMACY | Facility: CLINIC | Age: 44
End: 2024-12-27
Payer: COMMERCIAL

## 2025-01-06 ENCOUNTER — TREATMENT (OUTPATIENT)
Dept: PHYSICAL THERAPY | Facility: CLINIC | Age: 45
End: 2025-01-06
Payer: COMMERCIAL

## 2025-01-06 ENCOUNTER — SPECIALTY PHARMACY (OUTPATIENT)
Dept: PHARMACY | Facility: CLINIC | Age: 45
End: 2025-01-06

## 2025-01-06 ENCOUNTER — DOCUMENTATION (OUTPATIENT)
Dept: PHYSICAL THERAPY | Facility: CLINIC | Age: 45
End: 2025-01-06
Payer: COMMERCIAL

## 2025-01-06 DIAGNOSIS — I10 HTN (HYPERTENSION), BENIGN: ICD-10-CM

## 2025-01-06 ASSESSMENT — PAIN - FUNCTIONAL ASSESSMENT: PAIN_FUNCTIONAL_ASSESSMENT: 0-10

## 2025-01-06 NOTE — PROGRESS NOTES
Physical Therapy                 Therapy Communication Note    Patient Name: Deja Hazel  MRN: [id number]  Department:   Room: Room/bed info not found  Today's Date: 1/6/2025     Discipline: Physical Therapy          Comment: pt arrived for PT appt today. Her insurance updated with the start of the year and her cost per visit was high. She is starting with new insurance 1/27/25 with the switch to a new insurance with new employer ([employer]). She plans to wait to return after her new insurance takes effect.

## 2025-01-07 RX ORDER — ATENOLOL 25 MG/1
25 TABLET ORAL DAILY
Qty: 90 TABLET | Refills: 0 | Status: SHIPPED | OUTPATIENT
Start: 2025-01-07

## 2025-01-08 ENCOUNTER — SPECIALTY PHARMACY (OUTPATIENT)
Dept: PHARMACY | Facility: CLINIC | Age: 45
End: 2025-01-08

## 2025-01-08 DIAGNOSIS — E66.01 CLASS 3 SEVERE OBESITY DUE TO EXCESS CALORIES WITH SERIOUS COMORBIDITY AND BODY MASS INDEX (BMI) OF 40.0 TO 44.9 IN ADULT: ICD-10-CM

## 2025-01-08 DIAGNOSIS — E66.813 CLASS 3 SEVERE OBESITY DUE TO EXCESS CALORIES WITH SERIOUS COMORBIDITY AND BODY MASS INDEX (BMI) OF 40.0 TO 44.9 IN ADULT: ICD-10-CM

## 2025-01-08 RX ORDER — TIRZEPATIDE 2.5 MG/.5ML
2.5 INJECTION, SOLUTION SUBCUTANEOUS
Qty: 2 ML | Refills: 0 | OUTPATIENT
Start: 2025-01-08

## 2025-01-13 ENCOUNTER — APPOINTMENT (OUTPATIENT)
Dept: PHYSICAL THERAPY | Facility: CLINIC | Age: 45
End: 2025-01-13
Payer: COMMERCIAL

## 2025-01-14 ENCOUNTER — APPOINTMENT (OUTPATIENT)
Dept: PRIMARY CARE | Facility: CLINIC | Age: 45
End: 2025-01-14
Payer: COMMERCIAL

## 2025-01-14 ENCOUNTER — TELEMEDICINE (OUTPATIENT)
Dept: PHARMACY | Facility: HOSPITAL | Age: 45
End: 2025-01-14
Payer: COMMERCIAL

## 2025-01-14 VITALS
BODY MASS INDEX: 39.22 KG/M2 | HEIGHT: 65 IN | DIASTOLIC BLOOD PRESSURE: 55 MMHG | WEIGHT: 235.4 LBS | TEMPERATURE: 98.2 F | HEART RATE: 66 BPM | OXYGEN SATURATION: 98 % | SYSTOLIC BLOOD PRESSURE: 118 MMHG

## 2025-01-14 DIAGNOSIS — G89.29 CHRONIC BILATERAL LOW BACK PAIN WITH RIGHT-SIDED SCIATICA: ICD-10-CM

## 2025-01-14 DIAGNOSIS — E66.812 CLASS 2 SEVERE OBESITY DUE TO EXCESS CALORIES WITH SERIOUS COMORBIDITY AND BODY MASS INDEX (BMI) OF 39.0 TO 39.9 IN ADULT: Primary | ICD-10-CM

## 2025-01-14 DIAGNOSIS — E66.01 CLASS 2 SEVERE OBESITY DUE TO EXCESS CALORIES WITH SERIOUS COMORBIDITY AND BODY MASS INDEX (BMI) OF 39.0 TO 39.9 IN ADULT: Primary | ICD-10-CM

## 2025-01-14 DIAGNOSIS — E66.01 CLASS 3 SEVERE OBESITY DUE TO EXCESS CALORIES WITH SERIOUS COMORBIDITY AND BODY MASS INDEX (BMI) OF 40.0 TO 44.9 IN ADULT: ICD-10-CM

## 2025-01-14 DIAGNOSIS — E66.01 CLASS 2 SEVERE OBESITY DUE TO EXCESS CALORIES WITH SERIOUS COMORBIDITY AND BODY MASS INDEX (BMI) OF 39.0 TO 39.9 IN ADULT: ICD-10-CM

## 2025-01-14 DIAGNOSIS — M54.41 CHRONIC BILATERAL LOW BACK PAIN WITH RIGHT-SIDED SCIATICA: ICD-10-CM

## 2025-01-14 DIAGNOSIS — E66.812 CLASS 2 SEVERE OBESITY DUE TO EXCESS CALORIES WITH SERIOUS COMORBIDITY AND BODY MASS INDEX (BMI) OF 39.0 TO 39.9 IN ADULT: ICD-10-CM

## 2025-01-14 DIAGNOSIS — J45.20 MILD INTERMITTENT ASTHMA WITHOUT COMPLICATION (HHS-HCC): Primary | ICD-10-CM

## 2025-01-14 DIAGNOSIS — E66.813 CLASS 3 SEVERE OBESITY DUE TO EXCESS CALORIES WITH SERIOUS COMORBIDITY AND BODY MASS INDEX (BMI) OF 40.0 TO 44.9 IN ADULT: ICD-10-CM

## 2025-01-14 PROCEDURE — 1036F TOBACCO NON-USER: CPT | Performed by: FAMILY MEDICINE

## 2025-01-14 PROCEDURE — 3008F BODY MASS INDEX DOCD: CPT | Performed by: FAMILY MEDICINE

## 2025-01-14 PROCEDURE — RXMED WILLOW AMBULATORY MEDICATION CHARGE

## 2025-01-14 PROCEDURE — 99214 OFFICE O/P EST MOD 30 MIN: CPT | Performed by: FAMILY MEDICINE

## 2025-01-14 ASSESSMENT — ENCOUNTER SYMPTOMS
COUGH: 0
DIARRHEA: 0
FEVER: 0
WEAKNESS: 1
EYE PAIN: 0
ADENOPATHY: 0
SINUS PRESSURE: 0
ACTIVITY CHANGE: 0
CHILLS: 0
ARTHRALGIAS: 0
TROUBLE SWALLOWING: 0
FLANK PAIN: 0
CHEST TIGHTNESS: 0
BACK PAIN: 1
NAUSEA: 0
POLYPHAGIA: 0
OCCASIONAL FEELINGS OF UNSTEADINESS: 0
SLEEP DISTURBANCE: 0
WOUND: 0
FACIAL SWELLING: 0
NUMBNESS: 1
DEPRESSION: 0
EYE DISCHARGE: 0
HEMATURIA: 0
BRUISES/BLEEDS EASILY: 0
WHEEZING: 0
SHORTNESS OF BREATH: 0
ABDOMINAL PAIN: 0
COLOR CHANGE: 0
AGITATION: 0
FATIGUE: 0
CONSTIPATION: 0
UNEXPECTED WEIGHT CHANGE: 0
NECK PAIN: 1
VOMITING: 0
DIAPHORESIS: 0
FREQUENCY: 0
DYSURIA: 0
NERVOUS/ANXIOUS: 0
EYE REDNESS: 0
RHINORRHEA: 0
EYE ITCHING: 0
JOINT SWELLING: 0
DIZZINESS: 0
NECK STIFFNESS: 1
APPETITE CHANGE: 0
VOICE CHANGE: 0
BLOOD IN STOOL: 0
PALPITATIONS: 0
SINUS PAIN: 0
SORE THROAT: 0
POLYDIPSIA: 0
MYALGIAS: 0
LOSS OF SENSATION IN FEET: 0

## 2025-01-14 NOTE — PROGRESS NOTES
Subjective   Patient ID: Deja Hazel is a 44 y.o. female who presents for Follow-up (Patient is here for a medication follow up).  Today she is accompanied by alone.     HPI  Subjective:   Deja Hazel is a 44 y.o. female with hypertension.  Current Outpatient Medications   Medication Sig Dispense Refill    albuterol (Ventolin HFA) 90 mcg/actuation inhaler Inhale 1-2 puffs every 4 to 6 hours as needed for wheezing. 18 g 0    atenolol (Tenormin) 25 mg tablet Take 1 tablet (25 mg) by mouth once daily. 90 tablet 0    atenolol (Tenormin) 25 mg tablet Take 1 tablet (25 mg) by mouth once daily. 90 tablet 0    celecoxib (CeleBREX) 200 mg capsule Take 1 capsule (200 mg) by mouth once daily. 90 capsule 0    fluocinonide (Lidex) 0.05 % external solution Apply to affected areas of active dermatitis in the scalp twice daily as needed. Use less than 14 days per month. 180 mL 1    ketoconazole (NIZOral) 2 % cream Apply to affected areas twice daily when active. Once controlled, apply once daily for maintenance. 180 g 1    levonorgestrel (Mirena) 21 mcg/24 hours (8 yrs) 52 mg IUD by intrauterine route.      loratadine (Claritin) 10 mg tablet TAKE 1 TABLET BY MOUTH EVERY DAY 90 tablet 1    montelukast (Singulair) 10 mg tablet Take 1 tablet (10 mg) by mouth once daily. 90 tablet 1    pregabalin (Lyrica) 75 mg capsule Take 1 capsule (75 mg) by mouth once daily at bedtime. 30 capsule 0    ruxolitinib (Opzelura) 1.5 % cream APPLY ONE (1) APPLICATION TOPICALLY TWICE DAILY TO ACTIVE AREAS OF ECZEMA FOR UP TO 8 WEEKS AT A TIME. 180 g 1    urea (Carmol) 40 % cream Apply to affected areas of thickened skin once daily as tolerated. 255 g 1    tirzepatide, weight loss, (Zepbound) 5 mg/0.5 mL injection Inject 5 mg under the skin every 7 days. 4 each 1     No current facility-administered medications for this visit.      Hypertension ROS: taking medications as instructed, no medication side effects noted, no TIA's, no chest pain on  exertion, no dyspnea on exertion, and no swelling of ankles.   New concerns: would like to increase Zepbound.       Back Pain  This is a chronic problem. The current episode started several months ago. The problem has been gradually worsening since onset. The pain is present in the lumbar spine and sacro-iliac. The quality of the pain is described as aching, burning and shooting. The pain radiates to the right thigh and left thigh. The pain is at a severity of 5/10. The pain is moderate. The pain is The same all the time. The symptoms are aggravated by stress, lying down, position, sitting, standing, twisting, coughing and bending. Stiffness is present All day. Associated symptoms include leg pain, numbness, paresis, paresthesias, pelvic pain, tingling and weakness. Pertinent negatives include no abdominal pain, bladder incontinence, bowel incontinence, chest pain, dysuria, fever, headaches, perianal numbness or weight loss. Risk factors include obesity and menopause. She has tried bed rest, heat, home exercises, ice, chiropractic manipulation, NSAIDs and walking for the symptoms. The treatment provided no relief.   Has been in PT but was not able to get the therapy again  Review of Systems   Constitutional:  Negative for activity change, appetite change, chills, diaphoresis, fatigue, fever and unexpected weight change.   HENT:  Negative for congestion, dental problem, drooling, ear discharge, ear pain, facial swelling, hearing loss, nosebleeds, postnasal drip, rhinorrhea, sinus pressure, sinus pain, sneezing, sore throat, tinnitus, trouble swallowing and voice change.    Eyes:  Negative for pain, discharge, redness, itching and visual disturbance.   Respiratory:  Negative for cough, chest tightness, shortness of breath and wheezing.    Cardiovascular:  Negative for chest pain, palpitations and leg swelling.   Gastrointestinal:  Negative for abdominal pain, blood in stool, constipation, diarrhea, nausea and  "vomiting.   Endocrine: Negative for cold intolerance, heat intolerance, polydipsia, polyphagia and polyuria.   Genitourinary:  Negative for decreased urine volume, dysuria, flank pain, frequency, hematuria and urgency.   Musculoskeletal:  Positive for back pain, gait problem, neck pain and neck stiffness. Negative for arthralgias, joint swelling and myalgias.   Skin:  Negative for color change, pallor, rash and wound.   Neurological:  Positive for weakness and numbness. Negative for dizziness.   Hematological:  Negative for adenopathy. Does not bruise/bleed easily.   Psychiatric/Behavioral:  Negative for agitation, behavioral problems and sleep disturbance. The patient is not nervous/anxious.        Objective   /55   Pulse 66   Temp 36.8 °C (98.2 °F) (Oral)   Ht 1.651 m (5' 5\")   Wt 107 kg (235 lb 6.4 oz)   SpO2 98%   BMI 39.17 kg/m²     Waist 49 in  Hips 56 in    Physical Exam  Vitals and nursing note reviewed.   Constitutional:       General: She is not in acute distress.     Appearance: Normal appearance. She is obese. She is not ill-appearing, toxic-appearing or diaphoretic.   HENT:      Head: Normocephalic and atraumatic.      Right Ear: External ear normal.      Left Ear: External ear normal.      Nose: Nose normal. No congestion or rhinorrhea.      Mouth/Throat:      Mouth: Mucous membranes are moist.      Pharynx: Oropharynx is clear.   Eyes:      General: No scleral icterus.        Right eye: No discharge.         Left eye: No discharge.      Extraocular Movements: Extraocular movements intact.      Conjunctiva/sclera: Conjunctivae normal.      Pupils: Pupils are equal, round, and reactive to light.   Neck:      Vascular: No carotid bruit.   Cardiovascular:      Rate and Rhythm: Normal rate and regular rhythm.      Pulses: Normal pulses.      Heart sounds: Normal heart sounds. No murmur heard.     No friction rub. No gallop.   Pulmonary:      Effort: Pulmonary effort is normal. No respiratory " distress.      Breath sounds: Normal breath sounds. No stridor. No wheezing, rhonchi or rales.   Chest:      Chest wall: No tenderness.   Musculoskeletal:         General: Normal range of motion.      Cervical back: Normal range of motion and neck supple. No rigidity or tenderness.      Right lower leg: No edema.      Left lower leg: No edema.   Lymphadenopathy:      Cervical: No cervical adenopathy.   Skin:     General: Skin is warm and dry.   Neurological:      General: No focal deficit present.      Mental Status: She is alert and oriented to person, place, and time.   Psychiatric:         Mood and Affect: Mood normal.         Behavior: Behavior normal.         Thought Content: Thought content normal.         Judgment: Judgment normal.         Assessment/Plan   Problem List Items Addressed This Visit             ICD-10-CM    Mild intermittent asthma without complication ([facility]) - Primary J45.20    Class 2 severe obesity due to excess calories with serious comorbidity and body mass index (BMI) of 39.0 to 39.9 in adult E66.812, E66.01, Z68.39     Done by pharmacy below  Zepbound Education:     - Counseled patient on MOA, expectations, side effects, duration of therapy, contraindications, administration, and monitoring parameters  - Answered all patient questions and concerns  - Counseled patient on Zepbound MOA, expectations, side effects, duration of therapy, administration, and monitoring parameters.  - Provided detailed dosing and administration counseling to ensure proper technique.   - Reviewed Zepbound titration schedule, starting with 2.5 mg once weekly to a goal of 15 mg once weekly if tolerated  - Counseled patient on the benefits of GLP-1ra glycemic control and weight loss  - Reviewed storage requirements of Zepbound when not in use, and when to administer the medication if a dose is missed.  - Advised patient that they may experience improved satiety after meals and portion sizes of meals may be  reduced as doses of Zepbound increase to 5 mg weekly           Relevant Medications    tirzepatide, weight loss, (Zepbound) 5 mg/0.5 mL injection    Chronic bilateral low back pain with right-sided sciatica M54.41, G89.29       F/U in 3 months for HTN and Diabetes  Continue current medications  Call if any new concerns  Fasting labs prior to next visit  Reviewed consultation reports.  Immunizations revised  Labs reviewed

## 2025-01-14 NOTE — PROGRESS NOTES
Clinical Pharmacy Appointment    Patient ID: Deja Hazel is a 44 y.o. female who presents for Weight Loss.    Pt is here for Follow Up appointment.     Referring Provider: Jamila Escobar MD  PCP: Jamila Escobar MD   Last visit with PCP: 1/14/2025  Next visit with PCP: 4/21/2025      Subjective   HPI  Weight Loss  Starting Weight: 243 lbs   Starting BMI: 40.44 kg/m²     Current Weight: 235 lbs   Current BMI: 39.17 kg/m²     Comorbid Conditions:  HTN: No  Cholesterol: No      The 10-year ASCVD risk score (Aureliano MACHUCA, et al., 2019) is: 0.8%    Values used to calculate the score:      Age: 44 years      Sex: Female      Is Non- : No      Diabetic: No      Tobacco smoker: No      Systolic Blood Pressure: 118 mmHg      Is BP treated: Yes      HDL Cholesterol: 52.2 mg/dL      Total Cholesterol: 162 mg/dL     Drug Interactions  No relevant drug interactions were noted.    Medication System Management  Patient's preferred pharmacy:  [facility]   Adherence/Organization: None   Affordability/Accessibility: None     Patient Assistance Screening (VAF)  - Patient was approved on 12/17/2024 for Zepbound       Objective   Allergies   Allergen Reactions    Adhesive Tape-Silicones Itching    Augmentin [Amoxicillin-Pot Clavulanate] Unknown    Covid-19 Vaccine, Mrna, [id number], Lnp-S (Pfizer) Unknown    Penicillins Unknown    Salicylates Other    Latex Hives, Itching and Rash    Prednisone Diarrhea, Hives, Itching and Rash     Other reaction(s): GI Upset     Social History     Social History Narrative    Not on file      Medication Review  Current Outpatient Medications   Medication Instructions    albuterol (Ventolin HFA) 90 mcg/actuation inhaler Inhale 1-2 puffs every 4 to 6 hours as needed for wheezing.    atenolol (TENORMIN) 25 mg, oral, Daily    celecoxib (CELEBREX) 200 mg, oral, Daily    fluocinonide (Lidex) 0.05 % external solution Apply to affected areas of active dermatitis in the scalp  "twice daily as needed. Use less than 14 days per month.    ketoconazole (NIZOral) 2 % cream Apply to affected areas twice daily when active. Once controlled, apply once daily for maintenance.    levonorgestrel (Mirena) 21 mcg/24 hours (8 yrs) 52 mg IUD by intrauterine route.    loratadine (CLARITIN) 10 mg, oral, Daily    montelukast (SINGULAIR) 10 mg, oral, Daily    pregabalin (LYRICA) 75 mg, oral, Nightly    ruxolitinib (Opzelura) 1.5 % cream APPLY ONE (1) APPLICATION TOPICALLY TWICE DAILY TO ACTIVE AREAS OF ECZEMA FOR UP TO 8 WEEKS AT A TIME.    tirzepatide (weight loss) (ZEPBOUND) 5 mg, subcutaneous, Every 7 days    urea (Carmol) 40 % cream Apply to affected areas of thickened skin once daily as tolerated.      Vitals  BP Readings from Last 2 Encounters:   01/14/25 118/55   12/04/24 112/74     BMI Readings from Last 1 Encounters:   01/14/25 39.17 kg/m²      Labs  A1C  Lab Results   Component Value Date    HGBA1C 5.0 10/14/2024    HGBA1C 5.5 07/14/2023    HGBA1C 5.5 10/15/2022     BMP  Lab Results   Component Value Date    CALCIUM 9.0 10/14/2024     10/14/2024    K 4.4 10/14/2024    CO2 26 10/14/2024     10/14/2024    BUN 12 10/14/2024    CREATININE 0.54 10/14/2024    EGFR >90 10/14/2024     LFTs  Lab Results   Component Value Date    ALT 18 10/14/2024    AST 13 10/14/2024    ALKPHOS 72 10/14/2024    BILITOT 0.4 10/14/2024     FLP  Lab Results   Component Value Date    TRIG 102 10/14/2024    CHOL 162 10/14/2024    LDLF 84 11/07/2022    LDLCALC 89 10/14/2024    HDL 52.2 10/14/2024     Urine Microalbumin  No results found for: \"MICROALBCREA\"  Weight Management  Wt Readings from Last 3 Encounters:   01/14/25 107 kg (235 lb 6.4 oz)   12/04/24 110 kg (243 lb)   12/03/24 104 kg (230 lb 0.1 oz)      There is no height or weight on file to calculate BMI.     Assessment/Plan   Problem List Items Addressed This Visit       Class 2 severe obesity due to excess calories with serious comorbidity and body mass index " (BMI) of 39.0 to 39.9 in adult - Primary     Patient was referred for initiation of a GLP-1 agonist for weight loss.  Since last visit patient started Zepbound and reports no side effects.  The patient has lost about 8lbs and has a decrease in appetite.  Discussed increasing the dose and patient was agreeable.      PLAN:   - INCREASE to Zepbound 5 mg once weekly injection.  Prescription sent to [facility] pharmacy.              Relevant Medications    tirzepatide, weight loss, (Zepbound) 5 mg/0.5 mL injection    Other Relevant Orders    Referral to Clinical Pharmacy     Other Visit Diagnoses       Class 3 severe obesity due to excess calories with serious comorbidity and body mass index (BMI) of 40.0 to 44.9 in adult              Clinical Pharmacist follow-up: 4 weeks , Telehealth visit    Continue all meds under the continuation of care with the referring provider and clinical pharmacy team.    Thank you,  Carolina Augustin, PharmD   Clinical Pharmacist  [phone]    Verbal consent to manage patient's drug therapy was obtained from the patient. They were informed they may decline to participate or withdraw from participation in pharmacy services at any time.

## 2025-01-14 NOTE — ASSESSMENT & PLAN NOTE
Done by pharmacy below  Zepbound Education:     - Counseled patient on MOA, expectations, side effects, duration of therapy, contraindications, administration, and monitoring parameters  - Answered all patient questions and concerns  - Counseled patient on Zepbound MOA, expectations, side effects, duration of therapy, administration, and monitoring parameters.  - Provided detailed dosing and administration counseling to ensure proper technique.   - Reviewed Zepbound titration schedule, starting with 2.5 mg once weekly to a goal of 15 mg once weekly if tolerated  - Counseled patient on the benefits of GLP-1ra glycemic control and weight loss  - Reviewed storage requirements of Zepbound when not in use, and when to administer the medication if a dose is missed.  - Advised patient that they may experience improved satiety after meals and portion sizes of meals may be reduced as doses of Zepbound increase to 5 mg weekly

## 2025-01-14 NOTE — PATIENT INSTRUCTIONS
F/U in 3 months for HTN and Diabetes  Continue current medications  Call if any new concerns  Fasting labs prior to next visit  Reviewed consultation reports.  Immunizations revised  Labs reviewed    Current Outpatient Medications   Medication Sig Dispense Refill    albuterol (Ventolin HFA) 90 mcg/actuation inhaler Inhale 1-2 puffs every 4 to 6 hours as needed for wheezing. 18 g 0    atenolol (Tenormin) 25 mg tablet Take 1 tablet (25 mg) by mouth once daily. 90 tablet 0    celecoxib (CeleBREX) 200 mg capsule Take 1 capsule (200 mg) by mouth once daily. 90 capsule 0    fluocinonide (Lidex) 0.05 % external solution Apply to affected areas of active dermatitis in the scalp twice daily as needed. Use less than 14 days per month. 180 mL 1    ketoconazole (NIZOral) 2 % cream Apply to affected areas twice daily when active. Once controlled, apply once daily for maintenance. 180 g 1    levonorgestrel (Mirena) 21 mcg/24 hours (8 yrs) 52 mg IUD by intrauterine route.      loratadine (Claritin) 10 mg tablet TAKE 1 TABLET BY MOUTH EVERY DAY 90 tablet 1    montelukast (Singulair) 10 mg tablet Take 1 tablet (10 mg) by mouth once daily. 90 tablet 1    pregabalin (Lyrica) 75 mg capsule Take 1 capsule (75 mg) by mouth once daily at bedtime. 30 capsule 0    ruxolitinib (Opzelura) 1.5 % cream APPLY ONE (1) APPLICATION TOPICALLY TWICE DAILY TO ACTIVE AREAS OF ECZEMA FOR UP TO 8 WEEKS AT A TIME. 180 g 1    urea (Carmol) 40 % cream Apply to affected areas of thickened skin once daily as tolerated. 255 g 1    tirzepatide, weight loss, (Zepbound) 5 mg/0.5 mL injection Inject 5 mg under the skin every 7 days. 4 each 1     No current facility-administered medications for this visit.

## 2025-01-14 NOTE — ASSESSMENT & PLAN NOTE
Patient was referred for initiation of a GLP-1 agonist for weight loss.  Since last visit patient started Zepbound and reports no side effects.  The patient has lost about 8lbs and has a decrease in appetite.  Discussed increasing the dose and patient was agreeable.      PLAN:   - INCREASE to Zepbound 5 mg once weekly injection.  Prescription sent to [facility] pharmacy.

## 2025-01-16 ENCOUNTER — PHARMACY VISIT (OUTPATIENT)
Dept: PHARMACY | Facility: CLINIC | Age: 45
End: 2025-01-16
Payer: COMMERCIAL

## 2025-01-20 ENCOUNTER — APPOINTMENT (OUTPATIENT)
Dept: PHYSICAL THERAPY | Facility: CLINIC | Age: 45
End: 2025-01-20
Payer: COMMERCIAL

## 2025-01-28 ENCOUNTER — TELEPHONE (OUTPATIENT)
Dept: PRIMARY CARE | Facility: CLINIC | Age: 45
End: 2025-01-28
Payer: COMMERCIAL

## 2025-01-28 NOTE — TELEPHONE ENCOUNTER
Pt requesting 90 day refill of the following medication due to insurance change at end of January ([facility]).     Pt is out of the medication      Pregabalin  75 mg  1qd  #60  Refill:  0    [facility]

## 2025-01-29 ENCOUNTER — APPOINTMENT (OUTPATIENT)
Dept: PRIMARY CARE | Facility: CLINIC | Age: 45
End: 2025-01-29
Payer: COMMERCIAL

## 2025-01-29 DIAGNOSIS — G89.29 CHRONIC BILATERAL LOW BACK PAIN WITH RIGHT-SIDED SCIATICA: ICD-10-CM

## 2025-01-29 DIAGNOSIS — M54.12 CERVICAL RADICULAR PAIN: ICD-10-CM

## 2025-01-29 DIAGNOSIS — M54.41 CHRONIC BILATERAL LOW BACK PAIN WITH RIGHT-SIDED SCIATICA: ICD-10-CM

## 2025-01-29 DIAGNOSIS — M54.12 RIGHT CERVICAL RADICULOPATHY: ICD-10-CM

## 2025-01-29 RX ORDER — PREGABALIN 75 MG/1
75 CAPSULE ORAL NIGHTLY
Qty: 30 CAPSULE | Refills: 0 | Status: SHIPPED | OUTPATIENT
Start: 2025-01-29

## 2025-01-31 DIAGNOSIS — M54.12 CERVICAL RADICULAR PAIN: ICD-10-CM

## 2025-01-31 DIAGNOSIS — M54.41 CHRONIC BILATERAL LOW BACK PAIN WITH RIGHT-SIDED SCIATICA: ICD-10-CM

## 2025-01-31 DIAGNOSIS — M54.12 RIGHT CERVICAL RADICULOPATHY: ICD-10-CM

## 2025-01-31 DIAGNOSIS — G89.29 CHRONIC BILATERAL LOW BACK PAIN WITH RIGHT-SIDED SCIATICA: ICD-10-CM

## 2025-01-31 PROCEDURE — RXMED WILLOW AMBULATORY MEDICATION CHARGE

## 2025-02-01 ENCOUNTER — PHARMACY VISIT (OUTPATIENT)
Dept: PHARMACY | Facility: CLINIC | Age: 45
End: 2025-02-01
Payer: COMMERCIAL

## 2025-02-04 RX ORDER — PREGABALIN 75 MG/1
75 CAPSULE ORAL NIGHTLY
Qty: 90 CAPSULE | Refills: 0 | Status: SHIPPED | OUTPATIENT
Start: 2025-02-04

## 2025-02-06 DIAGNOSIS — E66.01 CLASS 2 SEVERE OBESITY DUE TO EXCESS CALORIES WITH SERIOUS COMORBIDITY AND BODY MASS INDEX (BMI) OF 39.0 TO 39.9 IN ADULT: ICD-10-CM

## 2025-02-06 DIAGNOSIS — E66.812 CLASS 2 SEVERE OBESITY DUE TO EXCESS CALORIES WITH SERIOUS COMORBIDITY AND BODY MASS INDEX (BMI) OF 39.0 TO 39.9 IN ADULT: ICD-10-CM

## 2025-02-06 RX ORDER — TIRZEPATIDE 5 MG/.5ML
5 INJECTION, SOLUTION SUBCUTANEOUS
Qty: 4 EACH | Refills: 0 | OUTPATIENT
Start: 2025-02-06

## 2025-02-11 ENCOUNTER — APPOINTMENT (OUTPATIENT)
Dept: PHARMACY | Facility: HOSPITAL | Age: 45
End: 2025-02-11
Payer: COMMERCIAL

## 2025-02-11 DIAGNOSIS — E66.01 CLASS 2 SEVERE OBESITY DUE TO EXCESS CALORIES WITH SERIOUS COMORBIDITY AND BODY MASS INDEX (BMI) OF 39.0 TO 39.9 IN ADULT: ICD-10-CM

## 2025-02-11 DIAGNOSIS — E66.812 CLASS 2 SEVERE OBESITY DUE TO EXCESS CALORIES WITH SERIOUS COMORBIDITY AND BODY MASS INDEX (BMI) OF 39.0 TO 39.9 IN ADULT: ICD-10-CM

## 2025-02-11 PROCEDURE — RXMED WILLOW AMBULATORY MEDICATION CHARGE

## 2025-02-11 NOTE — PROGRESS NOTES
Clinical Pharmacy Appointment    Patient ID: Deja Hazel is a 44 y.o. female who presents for Weight Loss.    Pt is here for Follow Up appointment.     Referring Provider: Jamila Escobar MD  PCP: Jamila Escobar MD   Last visit with PCP: 1/14/2025  Next visit with PCP: 4/21/2025      Subjective   HPI  Weight Loss  Starting Weight: 243 lbs   Starting BMI: 40.44 kg/m²     Current Weight: 230 lbs   Current BMI: 39 kg/m²     Comorbid Conditions:  HTN: No  Cholesterol: No      The 10-year ASCVD risk score (Aureliano MACHUCA, et al., 2019) is: 0.8%    Values used to calculate the score:      Age: 44 years      Sex: Female      Is Non- : No      Diabetic: No      Tobacco smoker: No      Systolic Blood Pressure: 118 mmHg      Is BP treated: Yes      HDL Cholesterol: 52.2 mg/dL      Total Cholesterol: 162 mg/dL     Drug Interactions  No relevant drug interactions were noted.    Medication System Management  Patient's preferred pharmacy:  [facility]   Adherence/Organization: None   Affordability/Accessibility: None     Patient Assistance Screening (VAF)  - Patient was approved on 12/17/2024 for Zepbound       Objective   Allergies   Allergen Reactions    Adhesive Tape-Silicones Itching    Augmentin [Amoxicillin-Pot Clavulanate] Unknown    Covid-19 Vaccine, Mrna, [id number], Lnp-S (Pfizer) Unknown    Penicillins Unknown    Salicylates Other    Latex Hives, Itching and Rash    Prednisone Diarrhea, Hives, Itching and Rash     Other reaction(s): GI Upset     Social History     Social History Narrative    Not on file      Medication Review  Current Outpatient Medications   Medication Instructions    albuterol (Ventolin HFA) 90 mcg/actuation inhaler Inhale 1-2 puffs every 4 to 6 hours as needed for wheezing.    atenolol (TENORMIN) 25 mg, oral, Daily    celecoxib (CELEBREX) 200 mg, oral, Daily    fluocinonide (Lidex) 0.05 % external solution Apply to affected areas of active dermatitis in the scalp  "twice daily as needed. Use less than 14 days per month.    ketoconazole (NIZOral) 2 % cream Apply to affected areas twice daily when active. Once controlled, apply once daily for maintenance.    levonorgestrel (Mirena) 21 mcg/24 hours (8 yrs) 52 mg IUD by intrauterine route.    loratadine (CLARITIN) 10 mg, oral, Daily    montelukast (SINGULAIR) 10 mg, oral, Daily    pregabalin (LYRICA) 75 mg, oral, Nightly    ruxolitinib (Opzelura) 1.5 % cream APPLY ONE (1) APPLICATION TOPICALLY TWICE DAILY TO ACTIVE AREAS OF ECZEMA FOR UP TO 8 WEEKS AT A TIME.    tirzepatide (weight loss) (ZEPBOUND) 7.5 mg, subcutaneous, Every 7 days    urea (Carmol) 40 % cream Apply to affected areas of thickened skin once daily as tolerated.      Vitals  BP Readings from Last 2 Encounters:   01/14/25 118/55   12/04/24 112/74     BMI Readings from Last 1 Encounters:   01/14/25 39.17 kg/m²      Labs  A1C  Lab Results   Component Value Date    HGBA1C 5.0 10/14/2024    HGBA1C 5.5 07/14/2023    HGBA1C 5.5 10/15/2022     BMP  Lab Results   Component Value Date    CALCIUM 9.0 10/14/2024     10/14/2024    K 4.4 10/14/2024    CO2 26 10/14/2024     10/14/2024    BUN 12 10/14/2024    CREATININE 0.54 10/14/2024    EGFR >90 10/14/2024     LFTs  Lab Results   Component Value Date    ALT 18 10/14/2024    AST 13 10/14/2024    ALKPHOS 72 10/14/2024    BILITOT 0.4 10/14/2024     FLP  Lab Results   Component Value Date    TRIG 102 10/14/2024    CHOL 162 10/14/2024    LDLF 84 11/07/2022    LDLCALC 89 10/14/2024    HDL 52.2 10/14/2024     Urine Microalbumin  No results found for: \"MICROALBCREA\"  Weight Management  Wt Readings from Last 3 Encounters:   01/14/25 107 kg (235 lb 6.4 oz)   12/04/24 110 kg (243 lb)   12/03/24 104 kg (230 lb 0.1 oz)      There is no height or weight on file to calculate BMI.     Assessment/Plan   Problem List Items Addressed This Visit       Class 2 severe obesity due to excess calories with serious comorbidity and body mass " index (BMI) of 39.0 to 39.9 in adult     Patient was referred for initiation of a GLP-1 agonist for weight loss.  Since last visit patient started the increased dose of Zepbound and reports no side effects.  The patient has lost about 14lbs total so far.   Patient has noticed that she does not have as much of a decrease in appetite at this point.  Discussed increasing the dose and patient was agreeable.      PLAN:   - INCREASE to Zepbound 7.5 mg once weekly injection.  Prescription sent to [facility] pharmacy.              Relevant Medications    tirzepatide, weight loss, (Zepbound) 7.5 mg/0.5 mL injection    Other Relevant Orders    Referral to Clinical Pharmacy     Clinical Pharmacist follow-up: 4 weeks , Telehealth visit    Continue all meds under the continuation of care with the referring provider and clinical pharmacy team.    Thank you,  Carolina Augustin, PharmD   Clinical Pharmacist  [phone]    Verbal consent to manage patient's drug therapy was obtained from the patient. They were informed they may decline to participate or withdraw from participation in pharmacy services at any time.

## 2025-02-11 NOTE — ASSESSMENT & PLAN NOTE
Patient was referred for initiation of a GLP-1 agonist for weight loss.  Since last visit patient started the increased dose of Zepbound and reports no side effects.  The patient has lost about 14lbs total so far.   Patient has noticed that she does not have as much of a decrease in appetite at this point.  Discussed increasing the dose and patient was agreeable.      PLAN:   - INCREASE to Zepbound 7.5 mg once weekly injection.  Prescription sent to [facility] pharmacy.

## 2025-02-12 ENCOUNTER — PHARMACY VISIT (OUTPATIENT)
Dept: PHARMACY | Facility: CLINIC | Age: 45
End: 2025-02-12
Payer: COMMERCIAL

## 2025-02-28 ENCOUNTER — SPECIALTY PHARMACY (OUTPATIENT)
Dept: PHARMACY | Facility: CLINIC | Age: 45
End: 2025-02-28

## 2025-03-05 DIAGNOSIS — E66.812 CLASS 2 SEVERE OBESITY DUE TO EXCESS CALORIES WITH SERIOUS COMORBIDITY AND BODY MASS INDEX (BMI) OF 39.0 TO 39.9 IN ADULT: ICD-10-CM

## 2025-03-05 DIAGNOSIS — E66.01 CLASS 2 SEVERE OBESITY DUE TO EXCESS CALORIES WITH SERIOUS COMORBIDITY AND BODY MASS INDEX (BMI) OF 39.0 TO 39.9 IN ADULT: ICD-10-CM

## 2025-03-05 RX ORDER — TIRZEPATIDE 7.5 MG/.5ML
7.5 INJECTION, SOLUTION SUBCUTANEOUS
Qty: 2 ML | Refills: 0 | OUTPATIENT
Start: 2025-03-05

## 2025-03-11 ENCOUNTER — APPOINTMENT (OUTPATIENT)
Dept: PHARMACY | Facility: HOSPITAL | Age: 45
End: 2025-03-11
Payer: COMMERCIAL

## 2025-03-11 DIAGNOSIS — E66.812 CLASS 2 SEVERE OBESITY DUE TO EXCESS CALORIES WITH SERIOUS COMORBIDITY AND BODY MASS INDEX (BMI) OF 39.0 TO 39.9 IN ADULT: ICD-10-CM

## 2025-03-11 DIAGNOSIS — E66.01 CLASS 2 SEVERE OBESITY DUE TO EXCESS CALORIES WITH SERIOUS COMORBIDITY AND BODY MASS INDEX (BMI) OF 39.0 TO 39.9 IN ADULT: ICD-10-CM

## 2025-03-11 NOTE — ASSESSMENT & PLAN NOTE
Patient was referred for initiation of a GLP-1 agonist for weight loss.  Since last visit patient started the increased dose of Zepbound and reports no side effects.  The patient has lost about 20 lbs total so far.   Patient has noticed that she does not have as much of a decrease in appetite at this point.  Discussed increasing the dose and patient was agreeable.      PLAN:   - INCREASE to Zepbound 10 mg once weekly injection.  Prescription sent to [facility] pharmacy.

## 2025-03-11 NOTE — PROGRESS NOTES
Clinical Pharmacy Appointment    Patient ID: Deja Hazel is a 44 y.o. female who presents for Weight Loss.    Pt is here for Follow Up appointment.     Referring Provider: Jamila Escobar MD  PCP: Jamila Escobar MD   Last visit with PCP: 1/14/2025  Next visit with PCP: 4/21/2025      Subjective   HPI  Weight Loss  Starting Weight: 243 lbs   Starting BMI: 40.44 kg/m²     Current Weight: 223 lbs   Current BMI: 38 kg/m²     Comorbid Conditions:  HTN: No  Cholesterol: No      The 10-year ASCVD risk score (Aureliano MACHUCA, et al., 2019) is: 0.8%    Values used to calculate the score:      Age: 44 years      Sex: Female      Is Non- : No      Diabetic: No      Tobacco smoker: No      Systolic Blood Pressure: 118 mmHg      Is BP treated: Yes      HDL Cholesterol: 52.2 mg/dL      Total Cholesterol: 162 mg/dL     Drug Interactions  No relevant drug interactions were noted.    Medication System Management  Patient's preferred pharmacy:  [facility]   Adherence/Organization: None   Affordability/Accessibility: None     Patient Assistance Screening (VAF)  - Patient was approved on 12/17/2024 for Zepbound       Objective   Allergies   Allergen Reactions    Adhesive Tape-Silicones Itching    Augmentin [Amoxicillin-Pot Clavulanate] Unknown    Covid-19 Vaccine, Mrna, [id number], Lnp-S (Pfizer) Unknown    Penicillins Unknown    Salicylates Other    Latex Hives, Itching and Rash    Prednisone Diarrhea, Hives, Itching and Rash     Other reaction(s): GI Upset     Social History     Social History Narrative    Not on file      Medication Review  Current Outpatient Medications   Medication Instructions    albuterol (Ventolin HFA) 90 mcg/actuation inhaler Inhale 1-2 puffs every 4 to 6 hours as needed for wheezing.    atenolol (TENORMIN) 25 mg, oral, Daily    celecoxib (CELEBREX) 200 mg, oral, Daily    fluocinonide (Lidex) 0.05 % external solution Apply to affected areas of active dermatitis in the scalp  "twice daily as needed. Use less than 14 days per month.    ketoconazole (NIZOral) 2 % cream Apply to affected areas twice daily when active. Once controlled, apply once daily for maintenance.    levonorgestrel (Mirena) 21 mcg/24 hours (8 yrs) 52 mg IUD by intrauterine route.    loratadine (CLARITIN) 10 mg, oral, Daily    montelukast (SINGULAIR) 10 mg, oral, Daily    pregabalin (LYRICA) 75 mg, oral, Nightly    ruxolitinib (Opzelura) 1.5 % cream APPLY ONE (1) APPLICATION TOPICALLY TWICE DAILY TO ACTIVE AREAS OF ECZEMA FOR UP TO 8 WEEKS AT A TIME.    tirzepatide (weight loss) (ZEPBOUND) 10 mg, subcutaneous, Every 7 days    urea (Carmol) 40 % cream Apply to affected areas of thickened skin once daily as tolerated.      Vitals  BP Readings from Last 2 Encounters:   01/14/25 118/55   12/04/24 112/74     BMI Readings from Last 1 Encounters:   01/14/25 39.17 kg/m²      Labs  A1C  Lab Results   Component Value Date    HGBA1C 5.0 10/14/2024    HGBA1C 5.5 07/14/2023    HGBA1C 5.5 10/15/2022     BMP  Lab Results   Component Value Date    CALCIUM 9.0 10/14/2024     10/14/2024    K 4.4 10/14/2024    CO2 26 10/14/2024     10/14/2024    BUN 12 10/14/2024    CREATININE 0.54 10/14/2024    EGFR >90 10/14/2024     LFTs  Lab Results   Component Value Date    ALT 18 10/14/2024    AST 13 10/14/2024    ALKPHOS 72 10/14/2024    BILITOT 0.4 10/14/2024     FLP  Lab Results   Component Value Date    TRIG 102 10/14/2024    CHOL 162 10/14/2024    LDLF 84 11/07/2022    LDLCALC 89 10/14/2024    HDL 52.2 10/14/2024     Urine Microalbumin  No results found for: \"MICROALBCREA\"  Weight Management  Wt Readings from Last 3 Encounters:   01/14/25 107 kg (235 lb 6.4 oz)   12/04/24 110 kg (243 lb)   12/03/24 104 kg (230 lb 0.1 oz)      There is no height or weight on file to calculate BMI.     Assessment/Plan   Problem List Items Addressed This Visit       Class 2 severe obesity due to excess calories with serious comorbidity and body mass " index (BMI) of 39.0 to 39.9 in adult     Patient was referred for initiation of a GLP-1 agonist for weight loss.  Since last visit patient started the increased dose of Zepbound and reports no side effects.  The patient has lost about 20 lbs total so far.   Patient has noticed that she does not have as much of a decrease in appetite at this point.  Discussed increasing the dose and patient was agreeable.      PLAN:   - INCREASE to Zepbound 10 mg once weekly injection.  Prescription sent to [facility] pharmacy.              Relevant Medications    tirzepatide, weight loss, (Zepbound) 10 mg/0.5 mL injection    Other Relevant Orders    Referral to Clinical Pharmacy     Clinical Pharmacist follow-up: 4 weeks , Telehealth visit    Continue all meds under the continuation of care with the referring provider and clinical pharmacy team.    Thank you,  Carolina Augustin, PharmD   Clinical Pharmacist  [phone]    Verbal consent to manage patient's drug therapy was obtained from the patient. They were informed they may decline to participate or withdraw from participation in pharmacy services at any time.

## 2025-03-11 NOTE — Clinical Note
Emil Escobar,   This patient has new insurance and now requires all prescriptions except for Zepbound and Opzelura to go [facility] mail order pharmacy.  Can you send in new prescriptions as patient is running out of some medications.  Please let me know if you have any questions or concerns.    Thank you,  Nathan

## 2025-03-15 ENCOUNTER — PHARMACY VISIT (OUTPATIENT)
Dept: PHARMACY | Facility: CLINIC | Age: 45
End: 2025-03-15
Payer: MEDICARE

## 2025-03-15 PROCEDURE — RXMED WILLOW AMBULATORY MEDICATION CHARGE

## 2025-03-21 DIAGNOSIS — G89.29 CHRONIC BILATERAL LOW BACK PAIN WITH RIGHT-SIDED SCIATICA: ICD-10-CM

## 2025-03-21 DIAGNOSIS — M54.41 CHRONIC BILATERAL LOW BACK PAIN WITH RIGHT-SIDED SCIATICA: ICD-10-CM

## 2025-03-21 DIAGNOSIS — I10 HTN (HYPERTENSION), BENIGN: ICD-10-CM

## 2025-03-21 DIAGNOSIS — M54.12 CERVICAL RADICULAR PAIN: ICD-10-CM

## 2025-03-21 DIAGNOSIS — B35.3 TINEA PEDIS OF BOTH FEET: ICD-10-CM

## 2025-03-21 DIAGNOSIS — J45.20 MILD INTERMITTENT ASTHMA WITHOUT COMPLICATION (HHS-HCC): ICD-10-CM

## 2025-03-21 DIAGNOSIS — L84 CALLUS: ICD-10-CM

## 2025-03-21 DIAGNOSIS — L21.9 SEBORRHEIC DERMATITIS: ICD-10-CM

## 2025-03-21 DIAGNOSIS — M54.12 RIGHT CERVICAL RADICULOPATHY: ICD-10-CM

## 2025-03-22 RX ORDER — ATENOLOL 25 MG/1
25 TABLET ORAL DAILY
Qty: 90 TABLET | Refills: 0 | Status: SHIPPED | OUTPATIENT
Start: 2025-03-22

## 2025-03-22 RX ORDER — FLUOCINONIDE TOPICAL SOLUTION USP, 0.05% 0.5 MG/ML
SOLUTION TOPICAL
Qty: 180 ML | Refills: 0 | OUTPATIENT
Start: 2025-03-22

## 2025-03-22 RX ORDER — MONTELUKAST SODIUM 10 MG/1
10 TABLET ORAL DAILY
Qty: 90 TABLET | Refills: 0 | Status: SHIPPED | OUTPATIENT
Start: 2025-03-22 | End: 2025-06-20

## 2025-03-22 RX ORDER — PREGABALIN 75 MG/1
75 CAPSULE ORAL NIGHTLY
Qty: 30 CAPSULE | Refills: 0 | Status: SHIPPED | OUTPATIENT
Start: 2025-03-22

## 2025-03-22 RX ORDER — UREA 40 %
CREAM (GRAM) TOPICAL
Qty: 255 G | Refills: 0 | OUTPATIENT
Start: 2025-03-22

## 2025-03-22 RX ORDER — CELECOXIB 200 MG/1
200 CAPSULE ORAL DAILY
Qty: 90 CAPSULE | Refills: 0 | Status: SHIPPED | OUTPATIENT
Start: 2025-03-22

## 2025-03-22 RX ORDER — KETOCONAZOLE 20 MG/G
CREAM TOPICAL
Qty: 180 G | Refills: 0 | OUTPATIENT
Start: 2025-03-22

## 2025-04-05 ENCOUNTER — OFFICE VISIT (OUTPATIENT)
Dept: PRIMARY CARE | Facility: CLINIC | Age: 45
End: 2025-04-05
Payer: COMMERCIAL

## 2025-04-05 VITALS
SYSTOLIC BLOOD PRESSURE: 100 MMHG | DIASTOLIC BLOOD PRESSURE: 78 MMHG | HEART RATE: 80 BPM | BODY MASS INDEX: 36.44 KG/M2 | WEIGHT: 219 LBS | OXYGEN SATURATION: 98 % | TEMPERATURE: 97.3 F

## 2025-04-05 DIAGNOSIS — J45.20 MILD INTERMITTENT ASTHMA WITHOUT COMPLICATION (HHS-HCC): ICD-10-CM

## 2025-04-05 DIAGNOSIS — J01.00 ACUTE NON-RECURRENT MAXILLARY SINUSITIS: ICD-10-CM

## 2025-04-05 DIAGNOSIS — J01.00 ACUTE NON-RECURRENT MAXILLARY SINUSITIS: Primary | ICD-10-CM

## 2025-04-05 DIAGNOSIS — E66.01 CLASS 2 SEVERE OBESITY DUE TO EXCESS CALORIES WITH SERIOUS COMORBIDITY AND BODY MASS INDEX (BMI) OF 39.0 TO 39.9 IN ADULT: ICD-10-CM

## 2025-04-05 DIAGNOSIS — E66.812 CLASS 2 SEVERE OBESITY DUE TO EXCESS CALORIES WITH SERIOUS COMORBIDITY AND BODY MASS INDEX (BMI) OF 39.0 TO 39.9 IN ADULT: ICD-10-CM

## 2025-04-05 PROCEDURE — 99213 OFFICE O/P EST LOW 20 MIN: CPT | Performed by: FAMILY MEDICINE

## 2025-04-05 RX ORDER — ALBUTEROL SULFATE 90 UG/1
1-2 INHALANT RESPIRATORY (INHALATION) EVERY 4 HOURS PRN
Qty: 18 G | Refills: 0 | OUTPATIENT
Start: 2025-04-05

## 2025-04-05 RX ORDER — AZITHROMYCIN 250 MG/1
TABLET, FILM COATED ORAL
Qty: 6 TABLET | Refills: 0 | OUTPATIENT
Start: 2025-04-05 | End: 2025-04-10

## 2025-04-05 RX ORDER — ALBUTEROL SULFATE 90 UG/1
1-2 INHALANT RESPIRATORY (INHALATION) EVERY 4 HOURS PRN
Qty: 18 G | Refills: 0 | Status: SHIPPED | OUTPATIENT
Start: 2025-04-05

## 2025-04-05 RX ORDER — AZITHROMYCIN 250 MG/1
TABLET, FILM COATED ORAL
Qty: 6 TABLET | Refills: 0 | Status: SHIPPED | OUTPATIENT
Start: 2025-04-05 | End: 2025-04-10

## 2025-04-05 ASSESSMENT — ENCOUNTER SYMPTOMS
APNEA: 0
COUGH: 1
CHILLS: 0
EYE REDNESS: 0
STRIDOR: 0
EYE ITCHING: 0
ACTIVITY CHANGE: 0
FATIGUE: 0

## 2025-04-05 NOTE — LETTER
April 5, 2025     Patient: Deja Hazel   YOB: 1980   Date of Visit: 4/5/2025       To Whom It May Concern:    Deja Hazel was seen in my clinic on 4/5/2025 at 11:20 am. Please excuse Deja for her absence from work on this day to make the appointment.    If you have any questions or concerns, please don't hesitate to call.         Sincerely,         Elton Long, DO

## 2025-04-05 NOTE — PATIENT INSTRUCTIONS
Treating for sinusitis.  Please take medication as directed.    If not improved or worsening symptoms, please let me know.

## 2025-04-05 NOTE — PROGRESS NOTES
Subjective   Patient ID: Deja Hazel is a 44 y.o. female who presents for Cough (runny nose, drainage, sinus pressure ; negative home covid test).    Water.  Problems seen.    Patient did have sinus pressure congestion.  A lot of pressure in the maxillary sinuses.  Getting fell drainage.  Little bit of cough now.    No troubles with fever shaking chills no bodyaches or discomfort.    At [place].    Congestion.      Cough  Associated symptoms include postnasal drip. Pertinent negatives include no chills or eye redness.    patient presents with cough nasal drainage congestion.  A lot of runny nose.    Review of Systems   Constitutional:  Negative for activity change, chills and fatigue.   HENT:  Positive for congestion and postnasal drip. Negative for dental problem, ear discharge, hearing loss and mouth sores.    Eyes:  Negative for redness and itching.   Respiratory:  Positive for cough. Negative for apnea and stridor.        Objective   /78   Pulse 80   Temp 36.3 °C (97.3 °F)   Wt 99.3 kg (219 lb)   SpO2 98%   BMI 36.44 kg/m²   BSA Body surface area is 2.13 meters squared.      Physical Exam  Constitutional:       Appearance: Normal appearance.   HENT:      Head: Normocephalic and atraumatic.      Comments: Pressure in the frontal and maxillary sinus     Right Ear: Tympanic membrane normal.      Left Ear: Tympanic membrane normal.   Cardiovascular:      Rate and Rhythm: Normal rate.   Pulmonary:      Effort: Pulmonary effort is normal.   Abdominal:      General: Abdomen is flat.   Neurological:      Mental Status: She is alert.       Lab on 11/13/2024   Component Date Value Ref Range Status    Fecal Occult Blood Immunochemical 11/13/2024 Negative  Negative Final    Negative result.  This test will not detect upper  gastrointestinal bleeding; the HemoQuant test (9220)should  be ordered if clinically indicated.     Test Performed by:  [facility]  [address] [place] [address]  : Marco Antonio Galvez Ph.D.; DAMONIA# [id number]   Lab on 10/14/2024   Component Date Value Ref Range Status    Cholesterol 10/14/2024 162  0 - 199 mg/dL Final          Age      Desirable   Borderline High   High     0-19 Y     0 - 169       170 - 199     >/= 200    20-24 Y     0 - 189       190 - 224     >/= 225         >24 Y     0 - 199       200 - 239     >/= 240   **All ranges are based on fasting samples. Specific   therapeutic targets will vary based on patient-specific   cardiac risk.    Pediatric guidelines reference:Pediatrics 2011, 128(S5).Adult guidelines reference: NCEP ATPIII Guidelines,JADE 2001, 258:2486-83    Venipuncture immediately after or during the administration of Metamizole may lead to falsely low results. Testing should be performed immediately prior to Metamizole dosing.    HDL-Cholesterol 10/14/2024 52.2  mg/dL Final      Age       Very Low   Low     Normal    High    0-19 Y    < 35      < 40     40-45     ----  20-24 Y    ----     < 40      >45      ----        >24 Y      ----     < 40     40-60      >60      Cholesterol/HDL Ratio 10/14/2024 3.1   Final      Ref Values  Desirable  < 3.4  High Risk  > 5.0    LDL Calculated 10/14/2024 89  <=99 mg/dL Final                                Near   Borderline      AGE      Desirable  Optimal    High     High     Very High     0-19 Y     0 - 109     ---    110-129   >/= 130     ----    20-24 Y     0 - 119     ---    120-159   >/= 160     ----      >24 Y     0 -  99   100-129  130-159   160-189     >/=190      VLDL 10/14/2024 20  0 - 40 mg/dL Final    Triglycerides 10/14/2024 102  0 - 149 mg/dL Final       Age         Desirable   Borderline High   High     Very High   0 D-90 D    19 - 174         ----         ----        ----  91 D- 9 Y     0 -  74        75 -  99     >/= 100      ----    10-19 Y     0 -  89        90 - 129     >/= 130      ----    20-24 Y     0 - 114       115 - 149     >/= 150      ----          >24 Y     0 - 149       150 - 199    200- 499    >/= 500    Venipuncture immediately after or during the administration of Metamizole may lead to falsely low results. Testing should be performed immediately prior to Metamizole dosing.    Non HDL Cholesterol 10/14/2024 110  0 - 149 mg/dL Final          Age       Desirable   Borderline High   High     Very High     0-19 Y     0 - 119       120 - 144     >/= 145    >/= 160    20-24 Y     0 - 149       150 - 189     >/= 190      ----         >24 Y    30 mg/dL above LDL Cholesterol goal      Vitamin D, 25-Hydroxy, Total 10/14/2024 19 (L)  30 - 100 ng/mL Final    Vitamin B12 10/14/2024 339  211 - 911 pg/mL Final    WBC 10/14/2024 9.8  4.4 - 11.3 x10*3/uL Final    nRBC 10/14/2024    Final    Not Measured    RBC 10/14/2024 4.94  4.00 - 5.20 x10*6/uL Final    Hemoglobin 10/14/2024 14.7  12.0 - 16.0 g/dL Final    Hematocrit 10/14/2024 43.2  36.0 - 46.0 % Final    MCV 10/14/2024 87  80 - 100 fL Final    MCH 10/14/2024 29.8  26.0 - 34.0 pg Final    MCHC 10/14/2024 34.0  32.0 - 36.0 g/dL Final    RDW 10/14/2024 12.6  11.5 - 14.5 % Final    Platelets 10/14/2024 280  150 - 450 x10*3/uL Final    Neutrophils % 10/14/2024 67.4  40.0 - 80.0 % Final    Immature Granulocytes %, Automated 10/14/2024 0.1  0.0 - 0.9 % Final    Immature Granulocyte Count (IG) includes promyelocytes, myelocytes and metamyelocytes but does not include bands. Percent differential counts (%) should be interpreted in the context of the absolute cell counts (cells/UL).    Lymphocytes % 10/14/2024 23.1  13.0 - 44.0 % Final    Monocytes % 10/14/2024 5.9  2.0 - 10.0 % Final    Eosinophils % 10/14/2024 3.2  0.0 - 6.0 % Final    Basophils % 10/14/2024 0.3  0.0 - 2.0 % Final    Neutrophils Absolute 10/14/2024 6.59  1.20 - 7.70 x10*3/uL Final    Percent differential counts (%) should be interpreted in the context of the absolute cell counts (cells/uL).    Immature Granulocytes Absolute, Au* 10/14/2024 0.01  0.00 -  0.70 x10*3/uL Final    Lymphocytes Absolute 10/14/2024 2.26  1.20 - 4.80 x10*3/uL Final    Monocytes Absolute 10/14/2024 0.58  0.10 - 1.00 x10*3/uL Final    Eosinophils Absolute 10/14/2024 0.31  0.00 - 0.70 x10*3/uL Final    Basophils Absolute 10/14/2024 0.03  0.00 - 0.10 x10*3/uL Final    Thyroid Stimulating Hormone 10/14/2024 2.31  0.44 - 3.98 mIU/L Final    Glucose 10/14/2024 97  74 - 99 mg/dL Final    Sodium 10/14/2024 139  136 - 145 mmol/L Final    Potassium 10/14/2024 4.4  3.5 - 5.3 mmol/L Final    Chloride 10/14/2024 105  98 - 107 mmol/L Final    Bicarbonate 10/14/2024 26  21 - 32 mmol/L Final    Anion Gap 10/14/2024 12  10 - 20 mmol/L Final    Urea Nitrogen 10/14/2024 12  6 - 23 mg/dL Final    Creatinine 10/14/2024 0.54  0.50 - 1.05 mg/dL Final    eGFR 10/14/2024 >90  >60 mL/min/1.73m*2 Final    Calculations of estimated GFR are performed using the 2021 CKD-EPI Study Refit equation without the race variable for the IDMS-Traceable creatinine methods.  https://jasn.asnjournals.org/content/early/2021/09/22/ASN.3681754712    Calcium 10/14/2024 9.0  8.6 - 10.6 mg/dL Final    Albumin 10/14/2024 4.3  3.4 - 5.0 g/dL Final    Alkaline Phosphatase 10/14/2024 72  33 - 110 U/L Final    Total Protein 10/14/2024 6.7  6.4 - 8.2 g/dL Final    AST 10/14/2024 13  9 - 39 U/L Final    Bilirubin, Total 10/14/2024 0.4  0.0 - 1.2 mg/dL Final    ALT 10/14/2024 18  7 - 45 U/L Final    Patients treated with Sulfasalazine may generate falsely decreased results for ALT.    Hemoglobin A1C 10/14/2024 5.0  See comment % Final    Estimated Average Glucose 10/14/2024 97  Not Established mg/dL Final   Office Visit on 08/29/2024   Component Date Value Ref Range Status    Case Report 08/29/2024    Final                    Value:Gynecologic Cytology                              Case: [id number]                                   Authorizing Provider:  Jamila Escobar MD          Collected:           08/29/2024 0939              Ordering Location:      [facility]    Received:            08/29/2024 0939              First Screen:          JOSE ALFREDO Nevarez                                                              Rescreen:              JOSE ALFREDO Russo                                                              Specimen:    ThinPrep Liquid-Based Pap-Imaging System Screen, ECTO/ENDOCERVIX/VAGINA, SCREENING         Final Cytological Interpretation 08/29/2024    Final                    Value:    A. THINPREP PAP ECTO/ENDOCERVIX/VAGINA, SCREENING -     Specimen Adequacy  Satisfactory for evaluation; absence of endocervical/transformation zone component    General Categorization  Negative for intraepithelial lesion or malignancy.    Descriptive Interpretation  Negative for intraepithelial lesion or malignancy      Specimen does not meet the requisition-stated criteria for HPV testing. See Pap test interpretation above.          08/29/2024    Final                    Value:Slide(s) initially screened by JOSE ALFREDO LARSON at [facility] [address] [facility] [id number]    QC review performed by JOSE ALFREDO Russo at [facility] [address] [place] [phone]  By the signature on this report, the individual or group listed as making the Final Interpretation/Diagnosis certifies that they have reviewed this case.       ThinPrep Imaging System 08/29/2024    Final                    Value:This specimen has been analyzed by the ThinPrep Imaging System ([employer] an automated imaging and review system, which assists the laboratory in evaluating cells on ThinPrep Pap tests. Following automated imaging, selected fields from every slide were reviewed by a cytotechnologist and/or pathologist.        Educational Note 08/29/2024    Final                    Value:Cervical cytology is a screening procedure primarily for squamous cancers and precursors and has associated false-negative and false-positives results as evidenced by  published data. Your patient's test should be interpreted in this context, together with the patient's history and clinical findings. Regular sampling and follow-up of unexplained clinical signs and symptoms are recommended to minimize false negative results.      Perform HPV HR test? 08/29/2024 Reflex if ASCUS only   Final    Include HPV Genotype? 08/29/2024 Yes   Final    Contraceptive History 08/29/2024    Final                    Value:IUD      POC Color, Urine 08/29/2024 Yellow  Straw, Yellow, Light-Yellow Final    POC Appearance, Urine 08/29/2024 Clear  Clear Final    POC Glucose, Urine 08/29/2024 NEGATIVE  NEGATIVE mg/dl Final    POC Bilirubin, Urine 08/29/2024 NEGATIVE  NEGATIVE Final    POC Ketones, Urine 08/29/2024 NEGATIVE  NEGATIVE mg/dl Final    POC Specific Gravity, Urine 08/29/2024 >=1.030  1.005 - 1.035 Final    POC Blood, Urine 08/29/2024 NEGATIVE  NEGATIVE Final    POC PH, Urine 08/29/2024 5.5  No Reference Range Established PH Final    POC Protein, Urine 08/29/2024 NEGATIVE  NEGATIVE, 30 (1+) mg/dl Final    POC Urobilinogen, Urine 08/29/2024 0.2  0.2, 1.0 EU/DL Final    Poc Nitrite, Urine 08/29/2024 NEGATIVE  NEGATIVE Final    POC Leukocytes, Urine 08/29/2024 NEGATIVE  NEGATIVE Final   Office Visit on 06/22/2024   Component Date Value Ref Range Status    RSV PCR 06/22/2024 Not Detected  Not Detected Final    Flu A Result 06/22/2024 Not Detected  Not Detected Final    Flu B Result 06/22/2024 Not Detected  Not Detected Final    Coronavirus 2019, PCR 06/22/2024 Not Detected  Not Detected Final     Current Outpatient Medications on File Prior to Visit   Medication Sig Dispense Refill    albuterol (Ventolin HFA) 90 mcg/actuation inhaler Inhale 1-2 puffs every 4 to 6 hours as needed for wheezing. 18 g 0    atenolol (Tenormin) 25 mg tablet Take 1 tablet (25 mg) by mouth once daily. 90 tablet 0    celecoxib (CeleBREX) 200 mg capsule Take 1 capsule (200 mg) by mouth once daily. 90 capsule 0    fluocinonide  (Lidex) 0.05 % external solution Apply to affected areas of active dermatitis in the scalp twice daily as needed. Use less than 14 days per month. 180 mL 1    ketoconazole (NIZOral) 2 % cream Apply to affected areas twice daily when active. Once controlled, apply once daily for maintenance. 180 g 1    levonorgestrel (Mirena) 21 mcg/24 hours (8 yrs) 52 mg IUD by intrauterine route.      loratadine (Claritin) 10 mg tablet TAKE 1 TABLET BY MOUTH EVERY DAY 90 tablet 1    montelukast (Singulair) 10 mg tablet Take 1 tablet (10 mg) by mouth once daily. 90 tablet 0    pregabalin (Lyrica) 75 mg capsule Take 1 capsule (75 mg) by mouth once daily at bedtime. 30 capsule 0    ruxolitinib (Opzelura) 1.5 % cream APPLY ONE (1) APPLICATION TOPICALLY TWICE DAILY TO ACTIVE AREAS OF ECZEMA FOR UP TO 8 WEEKS AT A TIME. 180 g 1    tirzepatide, weight loss, (Zepbound) 10 mg/0.5 mL injection Inject 10 mg under the skin every 7 days. 4 each 0    urea (Carmol) 40 % cream Apply to affected areas of thickened skin once daily as tolerated. 255 g 1     No current facility-administered medications on file prior to visit.     No images are attached to the encounter.            Assessment/Plan   Problem List Items Addressed This Visit             ICD-10-CM    Mild intermittent asthma without complication ([facility]) J45.20    Relevant Medications    albuterol (Ventolin HFA) 90 mcg/actuation inhaler     Other Visit Diagnoses         Codes    Acute non-recurrent maxillary sinusitis    -  Primary J01.00    Relevant Medications    azithromycin (Zithromax) 250 mg tablet

## 2025-04-07 RX ORDER — TIRZEPATIDE 10 MG/.5ML
10 INJECTION, SOLUTION SUBCUTANEOUS
Qty: 4 EACH | Refills: 0 | OUTPATIENT
Start: 2025-04-07

## 2025-04-07 RX ORDER — ALBUTEROL SULFATE 90 UG/1
1-2 INHALANT RESPIRATORY (INHALATION) EVERY 4 HOURS PRN
Qty: 18 G | Refills: 0 | OUTPATIENT
Start: 2025-04-07

## 2025-04-07 RX ORDER — AZITHROMYCIN 250 MG/1
TABLET, FILM COATED ORAL
Qty: 6 TABLET | Refills: 0 | OUTPATIENT
Start: 2025-04-07 | End: 2025-04-12

## 2025-04-08 ENCOUNTER — APPOINTMENT (OUTPATIENT)
Dept: PHARMACY | Facility: HOSPITAL | Age: 45
End: 2025-04-08
Payer: COMMERCIAL

## 2025-04-08 DIAGNOSIS — E66.812 CLASS 2 SEVERE OBESITY DUE TO EXCESS CALORIES WITH SERIOUS COMORBIDITY AND BODY MASS INDEX (BMI) OF 39.0 TO 39.9 IN ADULT: ICD-10-CM

## 2025-04-08 DIAGNOSIS — E66.01 CLASS 2 SEVERE OBESITY DUE TO EXCESS CALORIES WITH SERIOUS COMORBIDITY AND BODY MASS INDEX (BMI) OF 39.0 TO 39.9 IN ADULT: ICD-10-CM

## 2025-04-08 PROCEDURE — RXMED WILLOW AMBULATORY MEDICATION CHARGE

## 2025-04-08 NOTE — PROGRESS NOTES
Clinical Pharmacy Appointment    Patient ID: Deja Hazel is a 45 y.o. female who presents for Weight Loss.    Pt is here for Follow Up appointment.     Referring Provider: Jamila Escobar MD  PCP: Jamila Escobar MD   Last visit with PCP: 1/14/2025  Next visit with PCP: 4/21/2025      Subjective   HPI  Weight Loss  Starting Weight: 243 lbs   Starting BMI: 40.44 kg/m²     Current Weight: 219 lbs   Current BMI: 36.44 kg/m²     Comorbid Conditions:  HTN: No  Cholesterol: No      The 10-year ASCVD risk score (Aureliano MACHUCA, et al., 2019) is: 0.6%    Values used to calculate the score:      Age: 45 years      Sex: Female      Is Non- : No      Diabetic: No      Tobacco smoker: No      Systolic Blood Pressure: 100 mmHg      Is BP treated: Yes      HDL Cholesterol: 52.2 mg/dL      Total Cholesterol: 162 mg/dL     Drug Interactions  No relevant drug interactions were noted.    Medication System Management  Patient's preferred pharmacy:  [facility]   Adherence/Organization: None   Affordability/Accessibility: None     Patient Assistance Screening (VAF)  - Patient [facility] 12/17/2024 [facility]       Objective   Allergies   Allergen Reactions    Adhesive Tape-Silicones Itching    Augmentin [Amoxicillin-Pot Clavulanate] Unknown    Covid-19 Vaccine, Mrna, [id number], Lnp-S (Pfizer) Unknown    Penicillins Unknown    Salicylates Other    Latex Hives, Itching and Rash    Prednisone Diarrhea, Hives, Itching and Rash     Other reaction(s): GI Upset     Social History     Social History Narrative    Not on file      Medication Review  Current Outpatient Medications   Medication Instructions    albuterol (Ventolin HFA) 90 mcg/actuation inhaler Inhale 1-2 puffs every 4 to 6 hours as needed for wheezing.    atenolol (TENORMIN) 25 mg, oral, Daily    azithromycin (Zithromax) 250 mg tablet Take 2 tablets (500 mg) by mouth once daily for 1 day, THEN 1 tablet (250 mg) once daily for 4 days. Take 2  "tabs (500 mg) by mouth today, than 1 daily for 4 days..    celecoxib (CELEBREX) 200 mg, oral, Daily    fluocinonide (Lidex) 0.05 % external solution Apply to affected areas of active dermatitis in the scalp twice daily as needed. Use less than 14 days per month.    ketoconazole (NIZOral) 2 % cream Apply to affected areas twice daily when active. Once controlled, apply once daily for maintenance.    levonorgestrel (Mirena) 21 mcg/24 hours (8 yrs) 52 mg IUD by intrauterine route.    loratadine (CLARITIN) 10 mg, oral, Daily    montelukast (SINGULAIR) 10 mg, oral, Daily    pregabalin (LYRICA) 75 mg, oral, Nightly    ruxolitinib (Opzelura) 1.5 % cream APPLY ONE (1) APPLICATION TOPICALLY TWICE DAILY TO ACTIVE AREAS OF ECZEMA FOR UP TO 8 WEEKS AT A TIME.    tirzepatide (weight loss) (ZEPBOUND) 12.5 mg, subcutaneous, Every 7 days    urea (Carmol) 40 % cream Apply to affected areas of thickened skin once daily as tolerated.      Vitals  BP Readings from Last 2 Encounters:   04/05/25 100/78   01/14/25 118/55     BMI Readings from Last 1 Encounters:   04/05/25 36.44 kg/m²      Labs  A1C  Lab Results   Component Value Date    HGBA1C 5.0 10/14/2024    HGBA1C 5.5 07/14/2023    HGBA1C 5.5 10/15/2022     BMP  Lab Results   Component Value Date    CALCIUM 9.0 10/14/2024     10/14/2024    K 4.4 10/14/2024    CO2 26 10/14/2024     10/14/2024    BUN 12 10/14/2024    CREATININE 0.54 10/14/2024    EGFR >90 10/14/2024     LFTs  Lab Results   Component Value Date    ALT 18 10/14/2024    AST 13 10/14/2024    ALKPHOS 72 10/14/2024    BILITOT 0.4 10/14/2024     FLP  Lab Results   Component Value Date    TRIG 102 10/14/2024    CHOL 162 10/14/2024    LDLF 84 11/07/2022    LDLCALC 89 10/14/2024    HDL 52.2 10/14/2024     Urine Microalbumin  No results found for: \"MICROALBCREA\"  Weight Management  Wt Readings from Last 3 Encounters:   04/05/25 99.3 kg (219 lb)   01/14/25 107 kg (235 lb 6.4 oz)   12/04/24 110 kg (243 lb)      There is " no height or weight on file to calculate BMI.     Assessment/Plan   Problem List Items Addressed This Visit       Class 2 severe obesity due to excess calories with serious comorbidity and body mass index (BMI) of 39.0 to 39.9 in adult     Patient was referred for initiation of a GLP-1 agonist for weight loss.  Since last visit patient started the increased dose of Zepbound and reports no side effects.  The patient has lost about 26 lbs total so far.   Patient has noticed that she does not have as much of a decrease in appetite at this point.  Discussed increasing the dose and patient was agreeable.      PLAN:   - INCREASE to Zepbound 12.5 mg once weekly injection.  Prescription sent to [facility] pharmacy.              Relevant Medications    tirzepatide, weight loss, (Zepbound) 12.5 mg/0.5 mL injection    Other Relevant Orders    Referral to Clinical Pharmacy     Clinical Pharmacist follow-up: 4 weeks , Telehealth visit    Continue all meds under the continuation of care with the referring provider and clinical pharmacy team.    Thank you,  Carolina Augustin, PharmD   Clinical Pharmacist  [phone]    Verbal consent to manage patient's drug therapy was obtained from the patient. They were informed they may decline to participate or withdraw from participation in pharmacy services at any time.

## 2025-04-08 NOTE — ASSESSMENT & PLAN NOTE
Patient was referred for initiation of a GLP-1 agonist for weight loss.  Since last visit patient started the increased dose of Zepbound and reports no side effects.  The patient has lost about 26 lbs total so far.   Patient has noticed that she does not have as much of a decrease in appetite at this point.  Discussed increasing the dose and patient was agreeable.      PLAN:   - INCREASE to Zepbound 12.5 mg once weekly injection.  Prescription sent to [facility] pharmacy.

## 2025-04-10 ENCOUNTER — PHARMACY VISIT (OUTPATIENT)
Dept: PHARMACY | Facility: CLINIC | Age: 45
End: 2025-04-10
Payer: MEDICARE

## 2025-04-10 DIAGNOSIS — J01.00 ACUTE NON-RECURRENT MAXILLARY SINUSITIS: ICD-10-CM

## 2025-04-10 RX ORDER — AZITHROMYCIN 250 MG/1
TABLET, FILM COATED ORAL
Qty: 6 TABLET | Refills: 0 | Status: SHIPPED | OUTPATIENT
Start: 2025-04-10 | End: 2025-04-15

## 2025-04-10 NOTE — TELEPHONE ENCOUNTER
Patient started to feel better but started to feel worse yesterday   
Pt called back for a status on her medication stated she finished her z-pack and symptoms are returning. She stated she is working and requested for a return call to     Pt work # [phone]  
Pt finished her antibiotics yesterday and still feel congested. Her ears felt were clearing and now they are clogged again. She still have ansal congestion and is having hard time sleeping  through the night.   Please advice  
[facility] [phone]  Azithromycin 250mg tablet   Take 2 tablets (500 mg) by mouth once daily for 1 day, THEN 1 tablet (250 mg) once daily for 4 days. Take 2 tabs (500 mg) by mouth today, than 1 daily for 4 day     Pt is completely out and she said sinus infection is getting worse. Has appt scheduled on 4/21/25. Refill or does she need to come in sooner?  
No

## 2025-04-21 ENCOUNTER — APPOINTMENT (OUTPATIENT)
Dept: PRIMARY CARE | Facility: CLINIC | Age: 45
End: 2025-04-21
Payer: COMMERCIAL

## 2025-04-21 VITALS
SYSTOLIC BLOOD PRESSURE: 112 MMHG | HEIGHT: 65 IN | OXYGEN SATURATION: 95 % | WEIGHT: 215 LBS | DIASTOLIC BLOOD PRESSURE: 68 MMHG | BODY MASS INDEX: 35.82 KG/M2 | HEART RATE: 81 BPM | TEMPERATURE: 97.9 F

## 2025-04-21 DIAGNOSIS — Z01.84 IMMUNITY STATUS TESTING: ICD-10-CM

## 2025-04-21 DIAGNOSIS — E53.8 VITAMIN B 12 DEFICIENCY: Primary | ICD-10-CM

## 2025-04-21 PROCEDURE — 96372 THER/PROPH/DIAG INJ SC/IM: CPT | Performed by: FAMILY MEDICINE

## 2025-04-21 PROCEDURE — 1036F TOBACCO NON-USER: CPT | Performed by: FAMILY MEDICINE

## 2025-04-21 PROCEDURE — 99215 OFFICE O/P EST HI 40 MIN: CPT | Performed by: FAMILY MEDICINE

## 2025-04-21 PROCEDURE — 3008F BODY MASS INDEX DOCD: CPT | Performed by: FAMILY MEDICINE

## 2025-04-21 RX ORDER — CYANOCOBALAMIN 1000 UG/ML
1000 INJECTION, SOLUTION INTRAMUSCULAR; SUBCUTANEOUS ONCE
Status: COMPLETED | OUTPATIENT
Start: 2025-04-21 | End: 2025-04-21

## 2025-04-21 RX ADMIN — CYANOCOBALAMIN 1000 MCG: 1000 INJECTION, SOLUTION INTRAMUSCULAR; SUBCUTANEOUS at 09:59

## 2025-04-21 ASSESSMENT — ENCOUNTER SYMPTOMS
OCCASIONAL FEELINGS OF UNSTEADINESS: 0
DEPRESSION: 0
LOSS OF SENSATION IN FEET: 0

## 2025-04-21 NOTE — PROGRESS NOTES
"Subjective   Patient ID: Deja Hazel is a 45 y.o. female who presents for Follow-up (3 month medication follow up).  History of Present Illness      Review of Systems  ROS otherwise negative aside from what was mentioned above in HPI.    Objective     /68   Pulse 81   Temp 36.6 °C (97.9 °F) (Oral)   Ht 1.651 m (5' 5\")   Wt 97.5 kg (215 lb) Comment: Waist:43 Hips:51  SpO2 95%   BMI 35.78 kg/m²      Current Outpatient Medications   Medication Instructions    albuterol (Ventolin HFA) 90 mcg/actuation inhaler Inhale 1-2 puffs every 4 to 6 hours as needed for wheezing.    atenolol (TENORMIN) 25 mg, oral, Daily    celecoxib (CELEBREX) 200 mg, oral, Daily    fluocinonide (Lidex) 0.05 % external solution Apply to affected areas of active dermatitis in the scalp twice daily as needed. Use less than 14 days per month.    ketoconazole (NIZOral) 2 % cream Apply to affected areas twice daily when active. Once controlled, apply once daily for maintenance.    levonorgestrel (Mirena) 21 mcg/24 hours (8 yrs) 52 mg IUD by intrauterine route.    loratadine (CLARITIN) 10 mg, oral, Daily    montelukast (SINGULAIR) 10 mg, oral, Daily    pregabalin (LYRICA) 75 mg, oral, Nightly    ruxolitinib (Opzelura) 1.5 % cream APPLY ONE (1) APPLICATION TOPICALLY TWICE DAILY TO ACTIVE AREAS OF ECZEMA FOR UP TO 8 WEEKS AT A TIME.    urea (Carmol) 40 % cream Apply to affected areas of thickened skin once daily as tolerated.    Zepbound 12.5 mg, subcutaneous, Every 7 days       Physical Exam      Results      Assessment & Plan          Jamila Escobar MD     This medical note was created with the assistance of artificial intelligence (AI) for documentation purposes. The content has been reviewed and confirmed by the healthcare provider for accuracy and completeness. Patient consented to the use of audio recording and use of AI during their visit.     "

## 2025-04-21 NOTE — PROGRESS NOTES
Subjective   Patient ID: Deja Hazel is a 45 y.o. female who presents for Follow-up (3 month medication follow up).  History of Present Illness  Deja Hazel is a 45 year old female with hypertension and back pain who presents for management of high blood pressure, weight loss, and back pain.    She has experienced a weight loss of 28 pounds and is currently on tirzepatide 12.5 mg, which was increased two weeks ago. She reports no side effects from the medication. Although she has not noticed a significant change in appetite, her clothing fits looser, indicating a reduction in inches. She maintains a low-carb diet and consumes a protein supplement daily.    She experiences high functioning anxiety, which she feels contributes to her drive but also leads to exhaustion by the end of the week. She has taken time off work due to feeling drained. Her vitamin D level was noted to be low at 24 ng/mL, and she is taking vitamin D 2000 IU daily.    She has ongoing back pain, primarily on the right side, which radiates down her leg and causes a sensation of foot drop. She describes needing to perform exercises to loosen tight muscles. She has previously undergone physical therapy and used anti-inflammatory medications, but had to stop due to insurance issues. Sitting for extended periods exacerbates the pain, causing tingling sensations. No bowel or bladder issues reported.    She is concerned about her IUD, which was placed around five years ago during the COVID-19 pandemic. She experienced hemorrhaging after receiving a vaccine. She is aware that the Mirena IUD can remain in place for five to seven years.    She mentions being up to date with her vaccinations, including Hepatitis B, and requests documentation for her employer. She also inquires about the impact of cold water on digestion and discusses her perimenopausal symptoms, noting that she feels hot due to her active lifestyle but has not experienced typical  "symptoms of perimenopause.    Review of Systems  ROS otherwise negative aside from what was mentioned above in HPI.    Objective     /68   Pulse 81   Temp 36.6 °C (97.9 °F) (Oral)   Ht 1.651 m (5' 5\")   Wt 97.5 kg (215 lb) Comment: Waist:43 Hips:51  SpO2 95%   BMI 35.78 kg/m²      Current Outpatient Medications   Medication Instructions    albuterol (Ventolin HFA) 90 mcg/actuation inhaler Inhale 1-2 puffs every 4 to 6 hours as needed for wheezing.    atenolol (TENORMIN) 25 mg, oral, Daily    celecoxib (CELEBREX) 200 mg, oral, Daily    fluocinonide (Lidex) 0.05 % external solution Apply to affected areas of active dermatitis in the scalp twice daily as needed. Use less than 14 days per month.    ketoconazole (NIZOral) 2 % cream Apply to affected areas twice daily when active. Once controlled, apply once daily for maintenance.    levonorgestrel (Mirena) 21 mcg/24 hours (8 yrs) 52 mg IUD by intrauterine route.    loratadine (CLARITIN) 10 mg, oral, Daily    montelukast (SINGULAIR) 10 mg, oral, Daily    pregabalin (LYRICA) 75 mg, oral, Nightly    ruxolitinib (Opzelura) 1.5 % cream APPLY ONE (1) APPLICATION TOPICALLY TWICE DAILY TO ACTIVE AREAS OF ECZEMA FOR UP TO 8 WEEKS AT A TIME.    urea (Carmol) 40 % cream Apply to affected areas of thickened skin once daily as tolerated.    Zepbound 12.5 mg, subcutaneous, Every 7 days       Physical Exam  HEENT: Nose not congested. Throat clear.  CHEST: Lungs clear to auscultation.  CARDIOVASCULAR: Heart regular rate and rhythm, normal heart sounds.  ABDOMEN: Abdomen non-tender.  General: Alert and oriented, in no acute distress. Appears stated age, well-nourished, and well hydrated  HEENT:  - Head: Normocephalic and atraumatic   - Eyes: EOMI, PERRLA  - ENT: Hearing grossly intact  Heart: RRR. No murmurs, clicks, or rubs  Lungs: Unlabored breathing. CTAB with no crackles, wheezes, or rhonchi  Abdomen: Normal BS in all 4 quadrants. Soft, non-tender, non-distended, with no " masses  Extremities: Warm and well perfused. No edema. Normal peripheral pulses  Musculoskeletal: Normal gait and station Right sided SI joint pain with right leg straight leg raise.   Neurological: Alert and oriented. No gross neurological deficits  Psychological: Appropriate mood and affect  Skin: No rash, abnormal lesions, cyanosis, or erythema    Results  LABS  Vitamin D: 24 ng/mL  B12: 11 ng/mL  Total cholesterol: 154 mg/dL  HDL: 43 mg/dL  LDL: 97 mg/dL  Triglycerides: 59 mg/dL  WBC: elevated  Neutrophils: elevated  Lab Review  Patient Message on 04/06/2025   Component Date Value    CHOLESTEROL, TOTAL 04/08/2025 154     HDL CHOLESTEROL 04/08/2025 43 (L)     TRIGLYCERIDES 04/08/2025 59     LDL-CHOLESTEROL 04/08/2025 97     CHOL/HDLC RATIO 04/08/2025 3.6     NON HDL CHOLESTEROL 04/08/2025 111     VITAMIN D,25-OH,TOTAL,IA 04/08/2025 25 (L)     VITAMIN B12 04/08/2025 251     WHITE BLOOD CELL COUNT 04/08/2025 11.3 (H)     RED BLOOD CELL COUNT 04/08/2025 4.97     HEMOGLOBIN 04/08/2025 15.2     HEMATOCRIT 04/08/2025 44.0     MCV 04/08/2025 88.5     MCH 04/08/2025 30.6     MCHC 04/08/2025 34.5     RDW 04/08/2025 13.0     PLATELET COUNT 04/08/2025 311     MPV 04/08/2025 11.8     ABSOLUTE NEUTROPHILS 04/08/2025 8,859 (H)     ABSOLUTE LYMPHOCYTES 04/08/2025 1,616     ABSOLUTE MONOCYTES 04/08/2025 565     ABSOLUTE EOSINOPHILS 04/08/2025 215     ABSOLUTE BASOPHILS 04/08/2025 45     NEUTROPHILS 04/08/2025 78.4     LYMPHOCYTES 04/08/2025 14.3     MONOCYTES 04/08/2025 5.0     EOSINOPHILS 04/08/2025 1.9     BASOPHILS 04/08/2025 0.4     TSH W/REFLEX TO FT4 04/08/2025 1.66     GLUCOSE 04/08/2025 85     UREA NITROGEN (BUN) 04/08/2025 14     CREATININE 04/08/2025 0.52     EGFR 04/08/2025 115     SODIUM 04/08/2025 139     POTASSIUM 04/08/2025 4.0     CHLORIDE 04/08/2025 109     CARBON DIOXIDE 04/08/2025 22     ELECTROLYTE BALANCE 04/08/2025 8     CALCIUM 04/08/2025 8.8     PROTEIN, TOTAL 04/08/2025 6.8     ALBUMIN 04/08/2025 4.6      BILIRUBIN, TOTAL 04/08/2025 0.4     ALKALINE PHOSPHATASE 04/08/2025 68     AST 04/08/2025 13     ALT 04/08/2025 17     HEMOGLOBIN A1c 04/08/2025 5.0     eAG (mg/dL) 04/08/2025 97     eAG (mmol/L) 04/08/2025 5.4      Assessment & Plan  Low Back Pain  Chronic low back pain with right-sided sacroiliac joint pain and symptoms suggestive of foot drop. Previous physical therapy interrupted due to insurance issues. Referral to spine specialist preferred.  - Refer to spine specialist for further evaluation and management.    Hypertension  Hypertension well-managed with improved blood pressure readings. No side effects from current medication.  - Continue current antihypertensive medication regimen.    Obesity  Lost 28 pounds on tirzepatide 12.5 mg. No side effects. Reports losing inches but no significant appetite change. Discussed balanced diet importance.  - Continue tirzepatide 12.5 mg.  - Consult with pharmacist regarding nutrition.  - Provide diet sheet for a balanced diet.    High Functioning Anxiety  High functioning anxiety contributes to fatigue and feeling drained. Discussed work-rest balance and lifestyle modifications.  - Implement lifestyle modifications to manage anxiety and fatigue.    Vitamin D Deficiency  Vitamin D level low at 24. Recommended supplementation to improve levels and aid with anxiety and depression symptoms.  - Take Vitamin D 2000 IU daily.    Perimenopause  No symptoms of perimenopause but concerned about potential symptoms. Hormone levels cannot be checked due to hormone-secreting IUD. Discussed IUD removal option if symptoms develop.  - Consider removal of IUD to assess hormone levels if symptoms develop.    Vaccination Status  Up to date with vaccinations, including Hepatitis A and B. Work records need updating.  - Print immunization records.  - Update work records to reflect current vaccination status.    General Health Maintenance  Mammogram due in August. Tetanus vaccination due next  year. Discussed importance of routine screenings and vaccinations.  - Schedule mammogram for August.  - Ensure tetanus vaccination is up to date next year.    Vitamin B12 Deficiency  Vitamin B12 level low. B12 injection planned.  - Administer Vitamin B12 injection today.    VISIT SUMMARY:  Today, we discussed the management of your high blood pressure, weight loss, back pain, and other health concerns. You have lost 28 pounds and are currently on tirzepatide 12.5 mg with no side effects. We also addressed your anxiety, vitamin D deficiency, and vaccination status. Additionally, we talked about your back pain and the possibility of removing your IUD if perimenopausal symptoms develop.    YOUR PLAN:  -LOW BACK PAIN: You have chronic low back pain with symptoms that suggest foot drop. We will refer you to a spine specialist for further evaluation and management.    -HYPERTENSION: Your high blood pressure is well-managed with your current medication. Continue taking your antihypertensive medication as prescribed.    -OBESITY: You have lost 28 pounds on tirzepatide, which helps with weight loss. Continue taking tirzepatide 12.5 mg and consult with a pharmacist regarding your nutrition. A diet sheet for a balanced diet will be provided.    -HIGH FUNCTIONING ANXIETY: Your high functioning anxiety contributes to fatigue. Implement lifestyle modifications to better manage your anxiety and fatigue.    -VITAMIN D DEFICIENCY: Your vitamin D level is low. Continue taking Vitamin D 2000 IU daily to improve your levels and help with anxiety and depression symptoms.    -PERIMENOPAUSE: You have no symptoms of perimenopause but are concerned about potential symptoms. If symptoms develop, consider removing your IUD to assess hormone levels.    -VACCINATION STATUS: You are up to date with your vaccinations, including Hepatitis A and B. We will print your immunization records and update your work records.    -GENERAL HEALTH MAINTENANCE:  Your mammogram is due in August, and your tetanus vaccination is due next year. Routine screenings and vaccinations are important for your overall health.    -VITAMIN B12 DEFICIENCY: Your vitamin B12 level is low. We will administer a Vitamin B12 injection today to help improve your levels.    INSTRUCTIONS:  Please follow up with the spine specialist for your back pain evaluation. Continue your current medications and supplements as discussed. Schedule your mammogram for August and ensure your tetanus vaccination is up to date next year. We will provide your immunization records for your employer.    Jamila Escobar MD     This medical note was created with the assistance of artificial intelligence (AI) for documentation purposes. The content has been reviewed and confirmed by the healthcare provider for accuracy and completeness. Patient consented to the use of audio recording and use of AI during their visit.     I personally spent over 50% of a total [45 minutes in counseling and discussion with the patient and coordination of care as described above.

## 2025-04-23 LAB — HBV SURFACE AB SERPL IA-ACNC: 22 MIU/ML

## 2025-04-29 ENCOUNTER — TELEMEDICINE (OUTPATIENT)
Dept: PHARMACY | Facility: HOSPITAL | Age: 45
End: 2025-04-29
Payer: COMMERCIAL

## 2025-04-29 DIAGNOSIS — E66.812 CLASS 2 SEVERE OBESITY DUE TO EXCESS CALORIES WITH SERIOUS COMORBIDITY AND BODY MASS INDEX (BMI) OF 39.0 TO 39.9 IN ADULT: ICD-10-CM

## 2025-04-29 DIAGNOSIS — E66.01 CLASS 2 SEVERE OBESITY DUE TO EXCESS CALORIES WITH SERIOUS COMORBIDITY AND BODY MASS INDEX (BMI) OF 39.0 TO 39.9 IN ADULT: ICD-10-CM

## 2025-04-29 PROCEDURE — RXMED WILLOW AMBULATORY MEDICATION CHARGE

## 2025-04-29 NOTE — PROGRESS NOTES
Clinical Pharmacy Appointment    Patient ID: Deja Hazel is a 45 y.o. female who presents for Weight Loss.    Pt is here for Follow Up appointment.     Referring Provider: Jamila Escobar MD  PCP: Jamila Escobar MD   Last visit with PCP: 4/21/2025  Next visit with PCP: 7/31/2025      Subjective   HPI  Weight Loss  Starting Weight: 243 lbs   Starting BMI: 40.44 kg/m²     Current Weight: 215 lbs   Current BMI: 35.78 kg/m²     Comorbid Conditions:  HTN: No  Cholesterol: No    The 10-year ASCVD risk score (Aureliano MACHUCA, et al., 2019) is: 0.9%    Values used to calculate the score:      Age: 45 years      Sex: Female      Is Non- : No      Diabetic: No      Tobacco smoker: No      Systolic Blood Pressure: 112 mmHg      Is BP treated: Yes      HDL Cholesterol: 43 mg/dL      Total Cholesterol: 154 mg/dL     Drug Interactions  No relevant drug interactions were noted.    Medication System Management  Patient's preferred pharmacy:  [facility]   Adherence/Organization: None   Affordability/Accessibility: None     Patient Assistance Screening (VAF)  - Patient was approved on 12/17/2024 for Zepbound       Objective   Allergies   Allergen Reactions    Adhesive Tape-Silicones Itching    Augmentin [Amoxicillin-Pot Clavulanate] Unknown    Covid-19 Vaccine, Mrna, [id number], Lnp-S (Pfizer) Unknown    Penicillins Unknown    Salicylates Other    Latex Hives, Itching and Rash    Prednisone Diarrhea, Hives, Itching and Rash     Other reaction(s): GI Upset     Social History     Social History Narrative    Not on file      Medication Review  Current Outpatient Medications   Medication Instructions    albuterol (Ventolin HFA) 90 mcg/actuation inhaler Inhale 1-2 puffs every 4 to 6 hours as needed for wheezing.    atenolol (TENORMIN) 25 mg, oral, Daily    celecoxib (CELEBREX) 200 mg, oral, Daily    fluocinonide (Lidex) 0.05 % external solution Apply to affected areas of active dermatitis in the scalp twice  "daily as needed. Use less than 14 days per month.    ketoconazole (NIZOral) 2 % cream Apply to affected areas twice daily when active. Once controlled, apply once daily for maintenance.    levonorgestrel (Mirena) 21 mcg/24 hours (8 yrs) 52 mg IUD by intrauterine route.    loratadine (CLARITIN) 10 mg, oral, Daily    montelukast (SINGULAIR) 10 mg, oral, Daily    pregabalin (LYRICA) 75 mg, oral, Nightly    ruxolitinib (Opzelura) 1.5 % cream APPLY ONE (1) APPLICATION TOPICALLY TWICE DAILY TO ACTIVE AREAS OF ECZEMA FOR UP TO 8 WEEKS AT A TIME.    tirzepatide (weight loss) (ZEPBOUND) 15 mg, subcutaneous, Every 7 days    urea (Carmol) 40 % cream Apply to affected areas of thickened skin once daily as tolerated.      Vitals  BP Readings from Last 2 Encounters:   04/21/25 112/68   04/05/25 100/78     BMI Readings from Last 1 Encounters:   04/21/25 35.78 kg/m²      Labs  A1C  Lab Results   Component Value Date    HGBA1C 5.0 04/08/2025    HGBA1C 5.0 10/14/2024    HGBA1C 5.5 07/14/2023     BMP  Lab Results   Component Value Date    CALCIUM 8.8 04/08/2025     04/08/2025    K 4.0 04/08/2025    CO2 22 04/08/2025     04/08/2025    BUN 14 04/08/2025    CREATININE 0.52 04/08/2025    EGFR 115 04/08/2025     LFTs  Lab Results   Component Value Date    ALT 17 04/08/2025    AST 13 04/08/2025    ALKPHOS 68 04/08/2025    BILITOT 0.4 04/08/2025     FLP  Lab Results   Component Value Date    TRIG 59 04/08/2025    CHOL 154 04/08/2025    LDLF 84 11/07/2022    LDLCALC 97 04/08/2025    HDL 43 (L) 04/08/2025     Urine Microalbumin  No results found for: \"MICROALBCREA\"  Weight Management  Wt Readings from Last 3 Encounters:   04/21/25 97.5 kg (215 lb)   04/05/25 99.3 kg (219 lb)   01/14/25 107 kg (235 lb 6.4 oz)      There is no height or weight on file to calculate BMI.     Assessment/Plan   Problem List Items Addressed This Visit       Class 2 severe obesity due to excess calories with serious comorbidity and body mass index (BMI) of " 39.0 to 39.9 in adult    Patient was referred for initiation of a GLP-1 agonist for weight loss.  Since last visit patient started the increased dose of Zepbound and reports no side effects.  The patient has lost about 30 lbs total so far.   Patient has noticed that she does not have as much of a decrease in appetite at this point.  Discussed increasing the dose and patient was agreeable.      PLAN:   - INCREASE to Zepbound 15 mg once weekly injection.  Prescription sent to [facility] pharmacy.              Relevant Medications    tirzepatide, weight loss, (Zepbound) 15 mg/0.5 mL injection    Other Relevant Orders    Referral to Clinical Pharmacy     Clinical Pharmacist follow-up: 4 weeks , Telehealth visit    Continue all meds under the continuation of care with the referring provider and clinical pharmacy team.    Thank you,  Carolina Augustin, PharmD   Clinical Pharmacist  [phone]    Verbal consent to manage patient's drug therapy was obtained from the patient. They were informed they may decline to participate or withdraw from participation in pharmacy services at any time.

## 2025-04-29 NOTE — ASSESSMENT & PLAN NOTE
Patient was referred for initiation of a GLP-1 agonist for weight loss.  Since last visit patient started the increased dose of Zepbound and reports no side effects.  The patient has lost about 30 lbs total so far.   Patient has noticed that she does not have as much of a decrease in appetite at this point.  Discussed increasing the dose and patient was agreeable.      PLAN:   - INCREASE to Zepbound 15 mg once weekly injection.  Prescription sent to [facility] pharmacy.

## 2025-05-01 ENCOUNTER — PHARMACY VISIT (OUTPATIENT)
Dept: PHARMACY | Facility: CLINIC | Age: 45
End: 2025-05-01
Payer: MEDICARE

## 2025-05-06 ENCOUNTER — APPOINTMENT (OUTPATIENT)
Dept: PHARMACY | Facility: HOSPITAL | Age: 45
End: 2025-05-06
Payer: COMMERCIAL

## 2025-05-13 DIAGNOSIS — E66.812 CLASS 2 SEVERE OBESITY DUE TO EXCESS CALORIES WITH SERIOUS COMORBIDITY AND BODY MASS INDEX (BMI) OF 39.0 TO 39.9 IN ADULT: Primary | ICD-10-CM

## 2025-05-13 DIAGNOSIS — E66.01 CLASS 2 SEVERE OBESITY DUE TO EXCESS CALORIES WITH SERIOUS COMORBIDITY AND BODY MASS INDEX (BMI) OF 39.0 TO 39.9 IN ADULT: Primary | ICD-10-CM

## 2025-05-13 RX ORDER — BLOOD-GLUCOSE SENSOR
EACH MISCELLANEOUS
Qty: 2 EACH | Refills: 11 | Status: SHIPPED | OUTPATIENT
Start: 2025-05-13

## 2025-05-22 PROCEDURE — RXMED WILLOW AMBULATORY MEDICATION CHARGE

## 2025-05-27 ENCOUNTER — PHARMACY VISIT (OUTPATIENT)
Dept: PHARMACY | Facility: CLINIC | Age: 45
End: 2025-05-27
Payer: MEDICARE

## 2025-06-03 ENCOUNTER — APPOINTMENT (OUTPATIENT)
Dept: PHARMACY | Facility: HOSPITAL | Age: 45
End: 2025-06-03
Payer: COMMERCIAL

## 2025-06-03 DIAGNOSIS — E66.812 CLASS 2 SEVERE OBESITY DUE TO EXCESS CALORIES WITH SERIOUS COMORBIDITY AND BODY MASS INDEX (BMI) OF 39.0 TO 39.9 IN ADULT: ICD-10-CM

## 2025-06-03 DIAGNOSIS — E66.01 CLASS 2 SEVERE OBESITY DUE TO EXCESS CALORIES WITH SERIOUS COMORBIDITY AND BODY MASS INDEX (BMI) OF 39.0 TO 39.9 IN ADULT: ICD-10-CM

## 2025-06-03 NOTE — ASSESSMENT & PLAN NOTE
Patient was referred for initiation of a GLP-1 agonist for weight loss.  Since last visit patient started the increased dose of Zepbound and reports no side effects.  The patient has lost about 37 lbs total so far.   Patient has noticed that she does not have as much of a decrease in appetite at this point.  Will continue on maximum dose as patient is tolerating and still losing weight.      PLAN:   - Continue Zepbound 15 mg once weekly injection.  Prescription sent to [facility] pharmacy.

## 2025-06-03 NOTE — PROGRESS NOTES
Clinical Pharmacy Appointment    Patient ID: Deja Hazel is a 45 y.o. female who presents for Weight Loss.    Pt is here for Follow Up appointment.     Referring Provider: Jamila Escobar MD  PCP: Jamila Escobar MD   Last visit with PCP: 4/21/2025  Next visit with PCP: 7/31/2025    Subjective   HPI  Weight Loss  Starting Weight: 243 lbs   Starting BMI: 40.44 kg/m²     Current Weight: 206.8 lbs   Current BMI: 34.3 kg/m²     Comorbid Conditions:  HTN: No  Cholesterol: No    The 10-year ASCVD risk score (Aureliano MACHUCA, et al., 2019) is: 0.9%    Values used to calculate the score:      Age: 45 years      Sex: Female      Is Non- : No      Diabetic: No      Tobacco smoker: No      Systolic Blood Pressure: 112 mmHg      Is BP treated: Yes      HDL Cholesterol: 43 mg/dL      Total Cholesterol: 154 mg/dL     Drug Interactions  No relevant drug interactions were noted.    Medication System Management  Patient's preferred pharmacy:   Adherence/Organization: None   Affordability/Accessibility: None     Patient Assistance Screening (VAF)  - Patient was approved on 12/17/2024 for Zepbound     Objective   Allergies   Allergen Reactions    Adhesive Tape-Silicones Itching    Augmentin [Amoxicillin-Pot Clavulanate] Unknown    Covid-19 Vaccine, Mrna, [id number], Lnp-S (Pfizer) Unknown    Penicillins Unknown    Salicylates Other    Latex Hives, Itching and Rash    Prednisone Diarrhea, Hives, Itching and Rash     Other reaction(s): GI Upset     Social History     Social History Narrative    Not on file      Medication Review  Current Outpatient Medications   Medication Instructions    albuterol (Ventolin HFA) 90 mcg/actuation inhaler Inhale 1-2 puffs every 4 to 6 hours as needed for wheezing.    atenolol (TENORMIN) 25 mg, oral, Daily    blood-glucose sensor (FreeStyle Ernesto 3 Plus Sensor) device Use as directed.  Change every 15 days    celecoxib (CELEBREX) 200 mg, oral, Daily    fluocinonide (Lidex) 0.05 %  "external solution Apply to affected areas of active dermatitis in the scalp twice daily as needed. Use less than 14 days per month.    ketoconazole (NIZOral) 2 % cream Apply to affected areas twice daily when active. Once controlled, apply once daily for maintenance.    levonorgestrel (Mirena) 21 mcg/24 hours (8 yrs) 52 mg IUD by intrauterine route.    loratadine (CLARITIN) 10 mg, oral, Daily    montelukast (SINGULAIR) 10 mg, oral, Daily    pregabalin (LYRICA) 75 mg, oral, Nightly    ruxolitinib (Opzelura) 1.5 % cream APPLY ONE (1) APPLICATION TOPICALLY TWICE DAILY TO ACTIVE AREAS OF ECZEMA FOR UP TO 8 WEEKS AT A TIME.    urea (Carmol) 40 % cream Apply to affected areas of thickened skin once daily as tolerated.    Zepbound 15 mg, subcutaneous, Every 7 days      Vitals  BP Readings from Last 2 Encounters:   04/21/25 112/68   04/05/25 100/78     BMI Readings from Last 1 Encounters:   04/21/25 35.78 kg/m²      Labs  A1C  Lab Results   Component Value Date    HGBA1C 5.0 04/08/2025    HGBA1C 5.0 10/14/2024    HGBA1C 5.5 07/14/2023     BMP  Lab Results   Component Value Date    CALCIUM 8.8 04/08/2025     04/08/2025    K 4.0 04/08/2025    CO2 22 04/08/2025     04/08/2025    BUN 14 04/08/2025    CREATININE 0.52 04/08/2025    EGFR 115 04/08/2025     LFTs  Lab Results   Component Value Date    ALT 17 04/08/2025    AST 13 04/08/2025    ALKPHOS 68 04/08/2025    BILITOT 0.4 04/08/2025     FLP  Lab Results   Component Value Date    TRIG 59 04/08/2025    CHOL 154 04/08/2025    LDLF 84 11/07/2022    LDLCALC 97 04/08/2025    HDL 43 (L) 04/08/2025     Urine Microalbumin  No results found for: \"MICROALBCREA\"  Weight Management  Wt Readings from Last 3 Encounters:   04/21/25 97.5 kg (215 lb)   04/05/25 99.3 kg (219 lb)   01/14/25 107 kg (235 lb 6.4 oz)      There is no height or weight on file to calculate BMI.     Assessment/Plan   Problem List Items Addressed This Visit       Class 2 severe obesity due to excess calories " with serious comorbidity and body mass index (BMI) of 39.0 to 39.9 in adult    Patient was referred for initiation of a GLP-1 agonist for weight loss.  Since last visit patient started the increased dose of Zepbound and reports no side effects.  The patient has lost about 37 lbs total so far.   Patient has noticed that she does not have as much of a decrease in appetite at this point.  Will continue on maximum dose as patient is tolerating and still losing weight.      PLAN:   - Continue Zepbound 15 mg once weekly injection.  Prescription sent to [facility] pharmacy.              Relevant Orders    Referral to Clinical Pharmacy     Clinical Pharmacist follow-up: 4 weeks , Telehealth visit    Continue all meds under the continuation of care with the referring provider and clinical pharmacy team.    Thank you,  Carolina Augustin, PharmD   Clinical Pharmacist  [phone]    Verbal consent to manage patient's drug therapy was obtained from the patient. They were informed they may decline to participate or withdraw from participation in pharmacy services at any time.

## 2025-06-20 ENCOUNTER — PHARMACY VISIT (OUTPATIENT)
Dept: PHARMACY | Facility: CLINIC | Age: 45
End: 2025-06-20
Payer: MEDICARE

## 2025-06-20 ENCOUNTER — TELEMEDICINE (OUTPATIENT)
Dept: PRIMARY CARE | Facility: CLINIC | Age: 45
End: 2025-06-20
Payer: COMMERCIAL

## 2025-06-20 ENCOUNTER — LAB REQUISITION (OUTPATIENT)
Dept: LAB | Facility: HOSPITAL | Age: 45
End: 2025-06-20

## 2025-06-20 DIAGNOSIS — H81.13 BENIGN PAROXYSMAL VERTIGO, BILATERAL: ICD-10-CM

## 2025-06-20 DIAGNOSIS — H81.13 BENIGN PAROXYSMAL POSITIONAL VERTIGO DUE TO BILATERAL VESTIBULAR DISORDER: Primary | ICD-10-CM

## 2025-06-20 LAB
ANION GAP SERPL CALC-SCNC: 13 MMOL/L (ref 10–20)
BUN SERPL-MCNC: 13 MG/DL (ref 6–23)
CALCIUM SERPL-MCNC: 9.4 MG/DL (ref 8.6–10.3)
CHLORIDE SERPL-SCNC: 106 MMOL/L (ref 98–107)
CO2 SERPL-SCNC: 23 MMOL/L (ref 21–32)
CREAT SERPL-MCNC: 0.63 MG/DL (ref 0.5–1.05)
EGFRCR SERPLBLD CKD-EPI 2021: >90 ML/MIN/1.73M*2
GLUCOSE SERPL-MCNC: 72 MG/DL (ref 74–99)
POTASSIUM SERPL-SCNC: 3.7 MMOL/L (ref 3.5–5.3)
SODIUM SERPL-SCNC: 138 MMOL/L (ref 136–145)

## 2025-06-20 PROCEDURE — 1036F TOBACCO NON-USER: CPT | Performed by: STUDENT IN AN ORGANIZED HEALTH CARE EDUCATION/TRAINING PROGRAM

## 2025-06-20 PROCEDURE — 80048 BASIC METABOLIC PNL TOTAL CA: CPT

## 2025-06-20 PROCEDURE — 99213 OFFICE O/P EST LOW 20 MIN: CPT | Performed by: STUDENT IN AN ORGANIZED HEALTH CARE EDUCATION/TRAINING PROGRAM

## 2025-06-20 RX ORDER — MECLIZINE HYDROCHLORIDE 25 MG/1
25 TABLET ORAL 3 TIMES DAILY PRN
COMMUNITY
Start: 2025-06-19 | End: 2025-06-20

## 2025-06-20 RX ORDER — FLUTICASONE PROPIONATE 50 MCG
2 SPRAY, SUSPENSION (ML) NASAL DAILY
COMMUNITY
Start: 2025-06-19 | End: 2025-06-20

## 2025-06-20 RX ORDER — METHYLPREDNISOLONE 4 MG/1
TABLET ORAL
Qty: 21 TABLET | Refills: 0 | Status: SHIPPED | OUTPATIENT
Start: 2025-06-20 | End: 2025-06-26

## 2025-06-20 ASSESSMENT — ENCOUNTER SYMPTOMS
HEADACHES: 0
ARTHRALGIAS: 0
CHILLS: 0
SHORTNESS OF BREATH: 0
FEVER: 0
LOSS OF SENSATION IN FEET: 0
MYALGIAS: 0
DIZZINESS: 1
COUGH: 0
ABDOMINAL PAIN: 0
FATIGUE: 0
OCCASIONAL FEELINGS OF UNSTEADINESS: 0
DEPRESSION: 0
LIGHT-HEADEDNESS: 0

## 2025-06-20 ASSESSMENT — PATIENT HEALTH QUESTIONNAIRE - PHQ9
SUM OF ALL RESPONSES TO PHQ9 QUESTIONS 1 AND 2: 0
1. LITTLE INTEREST OR PLEASURE IN DOING THINGS: NOT AT ALL
2. FEELING DOWN, DEPRESSED OR HOPELESS: NOT AT ALL

## 2025-06-20 NOTE — PROGRESS NOTES
Subjective   Patient ID: Deja Hazel is a 45 y.o. female who presents for Follow-up (Possible vertigo.).    HPI     She went in to the ED by ambulance yesterday.  She was having severe vomiting and dizziness. Even the slightest movement was making her really dizzy.  She was checked in the ER at [facility]. EKG, and head CT head and neck was negative after falling while in the ER.  Her potassium was 2.9 and her other labs were overall normal. She had antinausea medication. She got a total of 60meq of potassium orally.   She was given a prescription of meclizine, zofran and flonase to go home.    Since being back home, overall she has been walking around and moving without being dizzy. She still feels like she is recovering.  Last night she could take the meclizine and slept on her side but felt fine.      Review of Systems   Constitutional:  Negative for chills, fatigue and fever.   HENT:  Negative for congestion.    Respiratory:  Negative for cough and shortness of breath.    Cardiovascular:  Negative for chest pain.   Gastrointestinal:  Negative for abdominal pain.   Musculoskeletal:  Negative for arthralgias and myalgias.   Neurological:  Positive for dizziness. Negative for light-headedness and headaches.       Objective   There were no vitals taken for this visit.    Physical Exam  Constitutional:       General: She is not in acute distress.     Appearance: Normal appearance. She is obese. She is not ill-appearing or toxic-appearing.   HENT:      Head: Normocephalic and atraumatic.   Neurological:      Mental Status: She is alert.         Assessment/Plan   Problem List Items Addressed This Visit    None  Visit Diagnoses         Codes      Benign paroxysmal positional vertigo due to bilateral vestibular disorder    -  Primary H81.13    Relevant Medications    methylPREDNISolone (Medrol Dospak) 4 mg tablets          History and physical examination as above.  Patient coming in for follow-up for vertigo after  being diagnosed in the emergency department.  Workup was otherwise unremarkable with a CT scan of the head and neck and EKG.  She was found to have low potassium and she was given a total of 60 mill equivalents in the ER.  Follow-up BMP ordered for the patient.  We did discuss additional symptoms including Epley maneuvers as well as staying well-hydrated and making sure to take it slow if she recovers.  She states that she has enough of the meclizine as well as the Zofran and Flonase that were prescribed from the ER and we did discuss correct use of the medications.  We did discuss medication risks, benefits and side effects of doing a Medrol Dosepak and she states she would like this sent into the pharmacy.  She did state that she thinks she had a reaction to a prednisone previously but will take this 1 with her just in case she needs it.  Discussed signs and symptoms that would require reevaluation in the office versus immediate treatment in the emergency department.  She is understanding and in agreement with this plan.    This visit was completed via telemedicine (video) call due to the restrictions of the COVID global pandemic. The visit was conducted between Deja Hazel, [facility]. and myself, Cristopher Cui DO, [facility]. The patient verbally consented to the telehealth visit and verbally confirmed their location. All issues were discussed and addressed as in the clinical documentation, but no physical exam was performed. If it was felt that the patient should be evaluated in a clinical setting, then they were directed there.  Spent 39:50 minutes with the patient over the telemedicine call and more than 50% of this time was spent in counseling and coordination of care.

## 2025-06-20 NOTE — PATIENT INSTRUCTIONS
Make sure that you are using electrolyte replacement formulations such as Pedialyte or liquid IV.  Physical exam numerous would be an Epley maneuver and you can look up how to perform this both in pictures and videos online.

## 2025-06-24 ENCOUNTER — APPOINTMENT (OUTPATIENT)
Dept: DERMATOLOGY | Facility: CLINIC | Age: 45
End: 2025-06-24
Payer: COMMERCIAL

## 2025-06-25 ENCOUNTER — TELEPHONE (OUTPATIENT)
Dept: PRIMARY CARE | Facility: CLINIC | Age: 45
End: 2025-06-25
Payer: COMMERCIAL

## 2025-06-25 DIAGNOSIS — E53.8 VITAMIN B 12 DEFICIENCY: Primary | ICD-10-CM

## 2025-06-25 DIAGNOSIS — H81.13 BENIGN PAROXYSMAL POSITIONAL VERTIGO DUE TO BILATERAL VESTIBULAR DISORDER: ICD-10-CM

## 2025-06-25 NOTE — TELEPHONE ENCOUNTER
I wasn't sure which one to send this to since RT is her PCP but she saw MT for vertigo and he ordered bw. She said she is still experiencing vertigo and wants orders placed to recheck, cmp, cbc and vitamin B before she goes back to work because she is not 100% better. Please advise [phone].

## 2025-07-01 ENCOUNTER — TELEPHONE (OUTPATIENT)
Dept: PRIMARY CARE | Facility: CLINIC | Age: 45
End: 2025-07-01
Payer: COMMERCIAL

## 2025-07-01 LAB
ALBUMIN SERPL-MCNC: 4.6 G/DL (ref 3.6–5.1)
ALP SERPL-CCNC: 66 U/L (ref 31–125)
ALT SERPL-CCNC: 26 U/L (ref 6–29)
ANION GAP SERPL CALCULATED.4IONS-SCNC: 9 MMOL/L (CALC) (ref 7–17)
AST SERPL-CCNC: 14 U/L (ref 10–35)
BASOPHILS # BLD AUTO: 38 CELLS/UL (ref 0–200)
BASOPHILS NFR BLD AUTO: 0.3 %
BILIRUB SERPL-MCNC: 0.3 MG/DL (ref 0.2–1.2)
BUN SERPL-MCNC: 12 MG/DL (ref 7–25)
CALCIUM SERPL-MCNC: 9.8 MG/DL (ref 8.6–10.2)
CHLORIDE SERPL-SCNC: 105 MMOL/L (ref 98–110)
CO2 SERPL-SCNC: 25 MMOL/L (ref 20–32)
CREAT SERPL-MCNC: 0.55 MG/DL (ref 0.5–0.99)
EGFRCR SERPLBLD CKD-EPI 2021: 114 ML/MIN/1.73M2
EOSINOPHIL # BLD AUTO: 164 CELLS/UL (ref 15–500)
EOSINOPHIL NFR BLD AUTO: 1.3 %
ERYTHROCYTE [DISTWIDTH] IN BLOOD BY AUTOMATED COUNT: 12.9 % (ref 11–15)
ERYTHROCYTE [SEDIMENTATION RATE] IN BLOOD BY WESTERGREN METHOD: 2 MM/H
GLUCOSE SERPL-MCNC: 74 MG/DL (ref 65–139)
HCT VFR BLD AUTO: 43.5 % (ref 35–45)
HGB BLD-MCNC: 14.6 G/DL (ref 11.7–15.5)
LYMPHOCYTES # BLD AUTO: 1462 CELLS/UL (ref 850–3900)
LYMPHOCYTES NFR BLD AUTO: 11.6 %
MCH RBC QN AUTO: 30.4 PG (ref 27–33)
MCHC RBC AUTO-ENTMCNC: 33.6 G/DL (ref 32–36)
MCV RBC AUTO: 90.4 FL (ref 80–100)
MONOCYTES # BLD AUTO: 567 CELLS/UL (ref 200–950)
MONOCYTES NFR BLD AUTO: 4.5 %
NEUTROPHILS # BLD AUTO: ABNORMAL CELLS/UL (ref 1500–7800)
NEUTROPHILS NFR BLD AUTO: 82.3 %
PLATELET # BLD AUTO: 319 THOUSAND/UL (ref 140–400)
PMV BLD REES-ECKER: 11.6 FL (ref 7.5–12.5)
POTASSIUM SERPL-SCNC: 4.4 MMOL/L (ref 3.5–5.3)
PROT SERPL-MCNC: 6.8 G/DL (ref 6.1–8.1)
RBC # BLD AUTO: 4.81 MILLION/UL (ref 3.8–5.1)
SODIUM SERPL-SCNC: 139 MMOL/L (ref 135–146)
TSH SERPL-ACNC: 2.75 MIU/L
VIT B12 SERPL-MCNC: 269 PG/ML (ref 200–1100)
WBC # BLD AUTO: 12.6 THOUSAND/UL (ref 3.8–10.8)

## 2025-07-01 PROCEDURE — RXMED WILLOW AMBULATORY MEDICATION CHARGE

## 2025-07-01 NOTE — TELEPHONE ENCOUNTER
Pt would like you to review her labs as well she would like to see you for an appt to review everything but you don't have any appts please advise? Pt also has a upcoming dermatology surgery and doesn't know if the results will affect the surgery.

## 2025-07-02 ENCOUNTER — PHARMACY VISIT (OUTPATIENT)
Dept: PHARMACY | Facility: CLINIC | Age: 45
End: 2025-07-02
Payer: MEDICARE

## 2025-07-03 ENCOUNTER — APPOINTMENT (OUTPATIENT)
Dept: DERMATOLOGY | Facility: CLINIC | Age: 45
End: 2025-07-03
Payer: COMMERCIAL

## 2025-07-03 ENCOUNTER — TELEPHONE (OUTPATIENT)
Dept: DERMATOLOGY | Facility: CLINIC | Age: 45
End: 2025-07-03

## 2025-07-03 NOTE — TELEPHONE ENCOUNTER
Patient called wanting to know Dr. Brown's opinion on her proceeding with her procedure to remove her lipoma today. She said that her PCP is doing a workup as there are unknown reasons for heightened CBC levels and low vitamin d levels in her body. Dr. Brown said it would be better for her to wait for her workup to be completed by here PCP before proceeding with this surgery.  made aware to help reschedule patient. Message left for patient asking that she call us back if she has any questions or concerns.

## 2025-07-08 ENCOUNTER — TELEPHONE (OUTPATIENT)
Dept: PRIMARY CARE | Facility: CLINIC | Age: 45
End: 2025-07-08
Payer: COMMERCIAL

## 2025-07-08 DIAGNOSIS — M54.12 CERVICAL RADICULAR PAIN: ICD-10-CM

## 2025-07-08 DIAGNOSIS — J45.20 MILD INTERMITTENT ASTHMA WITHOUT COMPLICATION (HHS-HCC): ICD-10-CM

## 2025-07-08 DIAGNOSIS — M54.41 CHRONIC BILATERAL LOW BACK PAIN WITH RIGHT-SIDED SCIATICA: ICD-10-CM

## 2025-07-08 DIAGNOSIS — G89.29 CHRONIC BILATERAL LOW BACK PAIN WITH RIGHT-SIDED SCIATICA: ICD-10-CM

## 2025-07-08 RX ORDER — MONTELUKAST SODIUM 10 MG/1
10 TABLET ORAL DAILY
Qty: 90 TABLET | Refills: 0 | Status: SHIPPED | OUTPATIENT
Start: 2025-07-08 | End: 2025-10-06

## 2025-07-08 RX ORDER — CELECOXIB 200 MG/1
200 CAPSULE ORAL DAILY
Qty: 90 CAPSULE | Refills: 0 | Status: SHIPPED | OUTPATIENT
Start: 2025-07-08

## 2025-07-08 NOTE — TELEPHONE ENCOUNTER
Pt called requesting a  refill for    montelukast (Singulair) 10 mg tablet   Take 1 tablet (10 mg) by mouth once daily.   Quantity: 90 tablet     celecoxib (CeleBREX) 200 mg capsule   Take 1 capsule (200 mg) by mouth once daily.   Quantity: 90 capsule      [facility] [address] [place], [place] [address], [place] - [address] [facility]   Phone: [phone]   Fax: [phone]   Last seen 6/20/25

## 2025-07-21 ENCOUNTER — TELEPHONE (OUTPATIENT)
Dept: PRIMARY CARE | Facility: CLINIC | Age: 45
End: 2025-07-21
Payer: COMMERCIAL

## 2025-07-21 DIAGNOSIS — E53.8 VITAMIN B 12 DEFICIENCY: Primary | ICD-10-CM

## 2025-07-21 DIAGNOSIS — D51.9 ANEMIA DUE TO VITAMIN B12 DEFICIENCY, UNSPECIFIED B12 DEFICIENCY TYPE: ICD-10-CM

## 2025-07-21 NOTE — TELEPHONE ENCOUNTER
Pt would like to know if you want her to get her vitamin B retested before her appt on 7/31/25?  Please advise?

## 2025-07-22 ENCOUNTER — APPOINTMENT (OUTPATIENT)
Dept: PRIMARY CARE | Facility: CLINIC | Age: 45
End: 2025-07-22
Payer: COMMERCIAL

## 2025-07-23 PROCEDURE — RXMED WILLOW AMBULATORY MEDICATION CHARGE

## 2025-07-24 ENCOUNTER — PHARMACY VISIT (OUTPATIENT)
Dept: PHARMACY | Facility: CLINIC | Age: 45
End: 2025-07-24
Payer: MEDICARE

## 2025-07-25 ENCOUNTER — APPOINTMENT (OUTPATIENT)
Dept: DERMATOLOGY | Facility: CLINIC | Age: 45
End: 2025-07-25
Payer: COMMERCIAL

## 2025-07-25 LAB
METHYLMALONATE SERPL-SCNC: NORMAL
VIT B12 SERPL-MCNC: 283 PG/ML (ref 200–1100)

## 2025-07-30 LAB
METHYLMALONATE SERPL-SCNC: 187 NMOL/L (ref 55–335)
VIT B12 SERPL-MCNC: 283 PG/ML (ref 200–1100)

## 2025-07-31 ENCOUNTER — APPOINTMENT (OUTPATIENT)
Dept: PRIMARY CARE | Facility: CLINIC | Age: 45
End: 2025-07-31
Payer: COMMERCIAL

## 2025-07-31 VITALS
OXYGEN SATURATION: 96 % | BODY MASS INDEX: 34.12 KG/M2 | HEIGHT: 65 IN | HEART RATE: 79 BPM | DIASTOLIC BLOOD PRESSURE: 67 MMHG | TEMPERATURE: 97.9 F | SYSTOLIC BLOOD PRESSURE: 112 MMHG | WEIGHT: 204.8 LBS

## 2025-07-31 DIAGNOSIS — I10 HTN (HYPERTENSION), BENIGN: ICD-10-CM

## 2025-07-31 DIAGNOSIS — E66.811 CLASS 1 OBESITY DUE TO EXCESS CALORIES WITH SERIOUS COMORBIDITY AND BODY MASS INDEX (BMI) OF 34.0 TO 34.9 IN ADULT: ICD-10-CM

## 2025-07-31 DIAGNOSIS — R42 ACUTE SEVERE VERTIGO: ICD-10-CM

## 2025-07-31 DIAGNOSIS — M54.12 RIGHT CERVICAL RADICULOPATHY: Primary | ICD-10-CM

## 2025-07-31 DIAGNOSIS — E87.6 HYPOKALEMIA WITH NORMAL PH: ICD-10-CM

## 2025-07-31 DIAGNOSIS — E66.09 CLASS 1 OBESITY DUE TO EXCESS CALORIES WITH SERIOUS COMORBIDITY AND BODY MASS INDEX (BMI) OF 34.0 TO 34.9 IN ADULT: ICD-10-CM

## 2025-07-31 DIAGNOSIS — G89.29 CHRONIC BILATERAL LOW BACK PAIN WITH RIGHT-SIDED SCIATICA: ICD-10-CM

## 2025-07-31 DIAGNOSIS — M54.41 CHRONIC BILATERAL LOW BACK PAIN WITH RIGHT-SIDED SCIATICA: ICD-10-CM

## 2025-07-31 DIAGNOSIS — M54.12 CERVICAL RADICULAR PAIN: ICD-10-CM

## 2025-07-31 DIAGNOSIS — D51.8 OTHER VITAMIN B12 DEFICIENCY ANEMIA: ICD-10-CM

## 2025-07-31 DIAGNOSIS — H81.13 BENIGN PAROXYSMAL POSITIONAL VERTIGO DUE TO BILATERAL VESTIBULAR DISORDER: ICD-10-CM

## 2025-07-31 PROCEDURE — 3074F SYST BP LT 130 MM HG: CPT | Performed by: FAMILY MEDICINE

## 2025-07-31 PROCEDURE — 3008F BODY MASS INDEX DOCD: CPT | Performed by: FAMILY MEDICINE

## 2025-07-31 PROCEDURE — 1036F TOBACCO NON-USER: CPT | Performed by: FAMILY MEDICINE

## 2025-07-31 PROCEDURE — G2212 PROLONG OUTPT/OFFICE VIS: HCPCS | Performed by: FAMILY MEDICINE

## 2025-07-31 PROCEDURE — 96372 THER/PROPH/DIAG INJ SC/IM: CPT | Performed by: FAMILY MEDICINE

## 2025-07-31 PROCEDURE — 3078F DIAST BP <80 MM HG: CPT | Performed by: FAMILY MEDICINE

## 2025-07-31 PROCEDURE — 99215 OFFICE O/P EST HI 40 MIN: CPT | Performed by: FAMILY MEDICINE

## 2025-07-31 RX ORDER — PREGABALIN 75 MG/1
75 CAPSULE ORAL 2 TIMES DAILY
COMMUNITY
Start: 2025-07-31

## 2025-07-31 RX ORDER — FLUTICASONE PROPIONATE 50 MCG
1 SPRAY, SUSPENSION (ML) NASAL 2 TIMES DAILY
Qty: 16 G | Refills: 1 | Status: SHIPPED | OUTPATIENT
Start: 2025-07-31 | End: 2025-08-01

## 2025-07-31 RX ORDER — CETIRIZINE HYDROCHLORIDE 10 MG/1
10 TABLET ORAL EVERY 12 HOURS
Qty: 180 TABLET | Refills: 0 | Status: SHIPPED | OUTPATIENT
Start: 2025-07-31

## 2025-07-31 RX ORDER — CYANOCOBALAMIN 1000 UG/ML
1000 INJECTION, SOLUTION INTRAMUSCULAR; SUBCUTANEOUS ONCE
Status: COMPLETED | OUTPATIENT
Start: 2025-07-31 | End: 2025-07-31

## 2025-07-31 RX ADMIN — CYANOCOBALAMIN 1000 MCG: 1000 INJECTION, SOLUTION INTRAMUSCULAR; SUBCUTANEOUS at 10:08

## 2025-07-31 ASSESSMENT — ENCOUNTER SYMPTOMS
LOSS OF SENSATION IN FEET: 0
DEPRESSION: 0
OCCASIONAL FEELINGS OF UNSTEADINESS: 0

## 2025-07-31 ASSESSMENT — PATIENT HEALTH QUESTIONNAIRE - PHQ9
1. LITTLE INTEREST OR PLEASURE IN DOING THINGS: NOT AT ALL
SUM OF ALL RESPONSES TO PHQ9 QUESTIONS 1 AND 2: 0
2. FEELING DOWN, DEPRESSED OR HOPELESS: NOT AT ALL

## 2025-07-31 NOTE — ASSESSMENT & PLAN NOTE
Stable on current medications  Continue meds and exercise.   Hypertension Plan  Continue current treatment regimen.  On listed meds for BP and doing well at this time.  Medication changes per orders.  Dietary sodium restriction.  Regular aerobic exercise.  Weight loss.  Reduce stress.  Patient Education: Reviewed risks of hypertension and principles of   treatment.

## 2025-07-31 NOTE — PROGRESS NOTES
Subjective   Patient ID: Deja Hazel is a 45 y.o. female who presents for Follow-up (3 month up).  History of Present Illness  Deja Hazel is a 45 year old female who presents with concerns about medication management and persistent vertigo symptoms.    She has been experiencing vertigo and ear-related symptoms, including a sensation of fluid and pressure in her ears, particularly when there are drastic temperature changes. Congestion and a feeling of moisture buildup, with crackling sounds in her ears, are more prominent on the right side. These symptoms have persisted for about a month, with some improvement but ongoing issues. She reported that she fell in the emergency room during a recent visit, where she experienced dizziness, nausea, and vomiting. She has not taken the prescribed steroids due to past adverse reactions to prednisone but has been using methotrexate, nasal spray, and liquid IV. Occasional dizziness occurs when looking down.    She experiences neck and back pain, with tightness and cramping in the lower back. There is a sensation of itching and irritation in her neck and back, with tingling down her leg when pressure is applied to certain areas. She has been using meclizine, which is almost finished. Her medication regimen includes methotrexate, nasal spray, and pregabalin, which she takes once a day. She has been using Advil for pain relief but notes limited effectiveness. She also uses a lumbar pillow and a cervical pillow for support during sleep.    She has been taking allergy medications including Zyrtec, Singulair, and Varanidine. She reports congestion and sneezing, attributing it to allergens and possibly her daughter's cats.    She has a history of low potassium and B12 levels, for which she has received B12 injections. She has experienced weight loss but notes a plateau in recent weeks due to being sedentary. She is concerned about her dizziness and its impact on her daily  "activities.    Review of Systems  ROS otherwise negative aside from what was mentioned above in HPI.    Objective     /67   Pulse 79   Temp 36.6 °C (97.9 °F) (Oral)   Ht 1.651 m (5' 5\")   Wt 92.9 kg (204 lb 12.8 oz)   SpO2 96%   BMI 34.08 kg/m²      Physical Exam      Assessment & Plan  Cervical radiculopathy with neck and upper back pain  Chronic neck and upper back pain with associated tightness and cramping. Occasional dizziness when looking down, more prominent on the right side. Symptoms suggest nerve irritation rather than fluid buildup in the ear. Previous adverse reaction to prednisone, but tolerates Medrol DosePak. The sensation of fluid is likely due to nerve irritation rather than actual fluid presence.  - Increase pregabalin to 75 mg twice a day, starting with one additional dose in the evening.  - Refer to vestibular rehabilitation.  - Consider cervical rehabilitation or physical therapy if vestibular rehab is not effective.  - Start Medrol DosePak to reduce inflammation.  - Stop Advil to prevent potential reflux and fluid buildup.    Chronic low back pain with radiculopathy  Persistent lower back pain with cramping and tingling down the leg, indicating radiculopathy. Symptoms may be exacerbated by prolonged standing and poor posture.  - Increase pregabalin to 75 mg twice a day, with an additional dose in the evening.  - Consider taking pregabalin after work to avoid daytime wooziness.    Allergic rhinitis  Symptoms of congestion, sneezing, and irritation, likely due to allergens and possibly compounded by fluid buildup in the nasal cavity. Current use of loratadine, Singulair, and Flonase. Plan to switch from loratadine to Zyrtec to address potential tolerance.  - Switch from loratadine to cetirizine (Zyrtec) 10 mg twice a day, start with once daily for 7-10 days to assess drowsiness.  - Use saline spray followed by Flonase twice a day, two sprays each time.  - Ensure adequate hydration.  - " Avoid excessive use of Advil to prevent reflux and fluid buildup.    Vitamin B12 deficiency  Low B12 levels with normal methylmalonic acid levels, indicating absorption issues rather than production. Previous B12 injections have been effective in raising levels.  - Administer B12 injection today.  - Consider sublingual B12 tablets or liquid for home use.  - Evaluate cost-effectiveness of prescription versus over-the-counter options.    Assessment & Plan  Right cervical radiculopathy         Benign paroxysmal positional vertigo due to bilateral vestibular disorder    Orders:    cetirizine (ZyrTEC) 10 mg tablet; Take 1 tablet (10 mg) by mouth every 12 hours.    fluticasone (Flonase Allergy Relief) 50 mcg/actuation nasal spray; Administer 1 spray into each nostril 2 times a day for 1 day.    Acute severe vertigo         HTN (hypertension), benign  Stable on current medications  Continue meds and exercise.   Hypertension Plan  Continue current treatment regimen.  On listed meds for BP and doing well at this time.  Medication changes per orders.  Dietary sodium restriction.  Regular aerobic exercise.  Weight loss.  Reduce stress.  Patient Education: Reviewed risks of hypertension and principles of   treatment.       Cervical radicular pain  Needs to see spine specialits       Chronic bilateral low back pain with right-sided sciatica  Needs to see spine specialist  Continue current medications        Class 1 obesity due to excess calories with serious comorbidity and body mass index (BMI) of 34.0 to 34.9 in adult         Hypokalemia with normal pH           Current Outpatient Medications   Medication Instructions    albuterol (Ventolin HFA) 90 mcg/actuation inhaler Inhale 1-2 puffs every 4 to 6 hours as needed for wheezing.    atenolol (TENORMIN) 25 mg, oral, Daily    blood-glucose sensor (FreeStyle Ernesto 3 Plus Sensor) device Use as directed.  Change every 15 days    celecoxib (CELEBREX) 200 mg, oral, Daily    cetirizine  (ZYRTEC) 10 mg, oral, Every 12 hours    fluocinonide (Lidex) 0.05 % external solution Apply to affected areas of active dermatitis in the scalp twice daily as needed. Use less than 14 days per month.    fluticasone (Flonase Allergy Relief) 50 mcg/actuation nasal spray 1 spray, Each Nostril, 2 times daily    ketoconazole (NIZOral) 2 % cream Apply to affected areas twice daily when active. Once controlled, apply once daily for maintenance.    levonorgestrel (Mirena) 21 mcg/24 hours (8 yrs) 52 mg IUD by intrauterine route.    montelukast (SINGULAIR) 10 mg, oral, Daily    pregabalin (LYRICA) 75 mg, oral, 2 times daily    ruxolitinib (Opzelura) 1.5 % cream APPLY ONE (1) APPLICATION TOPICALLY TWICE DAILY TO ACTIVE AREAS OF ECZEMA FOR UP TO 8 WEEKS AT A TIME.    urea (Carmol) 40 % cream Apply to affected areas of thickened skin once daily as tolerated.    Zepbound 15 mg, subcutaneous, Every 7 days     Current Outpatient Medications   Medication Sig Dispense Refill    albuterol (Ventolin HFA) 90 mcg/actuation inhaler Inhale 1-2 puffs every 4 to 6 hours as needed for wheezing. 18 g 0    atenolol (Tenormin) 25 mg tablet Take 1 tablet (25 mg) by mouth once daily. 90 tablet 0    blood-glucose sensor (FreeStyle Ernesto 3 Plus Sensor) device Use as directed.  Change every 15 days 2 each 11    celecoxib (CeleBREX) 200 mg capsule Take 1 capsule (200 mg) by mouth once daily. 90 capsule 0    fluocinonide (Lidex) 0.05 % external solution Apply to affected areas of active dermatitis in the scalp twice daily as needed. Use less than 14 days per month. 180 mL 1    ketoconazole (NIZOral) 2 % cream Apply to affected areas twice daily when active. Once controlled, apply once daily for maintenance. 180 g 1    levonorgestrel (Mirena) 21 mcg/24 hours (8 yrs) 52 mg IUD by intrauterine route.      montelukast (Singulair) 10 mg tablet Take 1 tablet (10 mg) by mouth once daily. 90 tablet 0    ruxolitinib (Opzelura) 1.5 % cream APPLY ONE (1)  APPLICATION TOPICALLY TWICE DAILY TO ACTIVE AREAS OF ECZEMA FOR UP TO 8 WEEKS AT A TIME. 180 g 1    tirzepatide, weight loss, (Zepbound) 15 mg/0.5 mL injection Inject 15 mg under the skin every 7 days. 4 each 3    urea (Carmol) 40 % cream Apply to affected areas of thickened skin once daily as tolerated. 255 g 1    cetirizine (ZyrTEC) 10 mg tablet Take 1 tablet (10 mg) by mouth every 12 hours. 180 tablet 0    fluticasone (Flonase Allergy Relief) 50 mcg/actuation nasal spray Administer 1 spray into each nostril 2 times a day for 1 day. 16 g 1    pregabalin (Lyrica) 75 mg capsule Take 1 capsule (75 mg) by mouth 2 times a day.       No current facility-administered medications for this visit.       Results  LABS    CBC: Within normal limits    Vitamin B12: Decreased    Comprehensive Metabolic Panel: Within normal limits  Lab Review  Telephone on 07/21/2025   Component Date Value    VITAMIN B12 07/25/2025 283     METHYLMALONIC ACID 07/25/2025 187    Patient Message on 07/02/2025   Component Date Value    WHITE BLOOD CELL COUNT 07/25/2025 9.7     RED BLOOD CELL COUNT 07/25/2025 4.71     HEMOGLOBIN 07/25/2025 14.3     HEMATOCRIT 07/25/2025 42.9     MCV 07/25/2025 91.1     MCH 07/25/2025 30.4     MCHC 07/25/2025 33.3     RDW 07/25/2025 12.7     PLATELET COUNT 07/25/2025 315     MPV 07/25/2025 11.4     ABSOLUTE NEUTROPHILS 07/25/2025 6,683     ABSOLUTE LYMPHOCYTES 07/25/2025 2,144     ABSOLUTE MONOCYTES 07/25/2025 601     ABSOLUTE EOSINOPHILS 07/25/2025 233     ABSOLUTE BASOPHILS 07/25/2025 39     NEUTROPHILS 07/25/2025 68.9     LYMPHOCYTES 07/25/2025 22.1     MONOCYTES 07/25/2025 6.2     EOSINOPHILS 07/25/2025 2.4     BASOPHILS 07/25/2025 0.4    Telephone on 06/25/2025   Component Date Value    WHITE BLOOD CELL COUNT 06/30/2025 12.6 (H)     RED BLOOD CELL COUNT 06/30/2025 4.81     HEMOGLOBIN 06/30/2025 14.6     HEMATOCRIT 06/30/2025 43.5     MCV 06/30/2025 90.4     MCH 06/30/2025 30.4     MCHC 06/30/2025 33.6     RDW  06/30/2025 12.9     PLATELET COUNT 06/30/2025 319     MPV 06/30/2025 11.6     ABSOLUTE NEUTROPHILS 06/30/2025 10,370 (H)     ABSOLUTE LYMPHOCYTES 06/30/2025 1,462     ABSOLUTE MONOCYTES 06/30/2025 567     ABSOLUTE EOSINOPHILS 06/30/2025 164     ABSOLUTE BASOPHILS 06/30/2025 38     NEUTROPHILS 06/30/2025 82.3     LYMPHOCYTES 06/30/2025 11.6     MONOCYTES 06/30/2025 4.5     EOSINOPHILS 06/30/2025 1.3     BASOPHILS 06/30/2025 0.3     GLUCOSE 06/30/2025 74     UREA NITROGEN (BUN) 06/30/2025 12     CREATININE 06/30/2025 0.55     EGFR 06/30/2025 114     SODIUM 06/30/2025 139     POTASSIUM 06/30/2025 4.4     CHLORIDE 06/30/2025 105     CARBON DIOXIDE 06/30/2025 25     ELECTROLYTE BALANCE 06/30/2025 9     CALCIUM 06/30/2025 9.8     PROTEIN, TOTAL 06/30/2025 6.8     ALBUMIN 06/30/2025 4.6     BILIRUBIN, TOTAL 06/30/2025 0.3     ALKALINE PHOSPHATASE 06/30/2025 66     AST 06/30/2025 14     ALT 06/30/2025 26     TSH W/REFLEX TO FT4 06/30/2025 2.75     VITAMIN B12 06/30/2025 269     SED RATE BY MODIFIED DEBBY* 06/30/2025 2    Lab Requisition on 06/20/2025   Component Date Value    Glucose 06/20/2025 72 (L)     Sodium 06/20/2025 138     Potassium 06/20/2025 3.7     Chloride 06/20/2025 106     Bicarbonate 06/20/2025 23     Anion Gap 06/20/2025 13     Urea Nitrogen 06/20/2025 13     Creatinine 06/20/2025 0.63     eGFR 06/20/2025 >90     Calcium 06/20/2025 9.4            Jamila Escobar MD     This medical note was created with the assistance of artificial intelligence (AI) for documentation purposes. The content has been reviewed and confirmed by the healthcare provider for accuracy and completeness. Patient consented to the use of audio recording and use of AI during their visit.     I personally spent over 50% of a total 55 minutes in counseling and discussion with the patient and coordination of care as described above.

## 2025-07-31 NOTE — PATIENT INSTRUCTIONS
Start NielMed nasal rinse 2 times a day then follow it with Flonase  Can use Saline spray throughout the day  Cool mist humidifier  Zyrtec 10 mg  bid for 7-10 days and then as needed  Call if any worse or if no improvement in 3-5 days  Increase fluids  Increase Pregabalin 75 mg in the evenings      VISIT SUMMARY:  Today, we discussed your concerns about medication management and persistent vertigo symptoms. You have been experiencing vertigo, ear-related symptoms, neck and back pain, and allergic rhinitis. We reviewed your current medications and made some adjustments to better manage your symptoms. We also addressed your low B12 levels and provided a plan to maintain adequate levels.    YOUR PLAN:  -CERVICAL RADICULOPATHY WITH NECK AND UPPER BACK PAIN: Cervical radiculopathy is a condition where nerves in the neck are irritated, causing pain and other symptoms. We will increase your pregabalin dose to 75 mg twice a day, starting with an additional dose in the evening. You are referred to vestibular rehabilitation, and if that is not effective, we may consider cervical rehabilitation or physical therapy. You will start a Medrol DosePak to reduce inflammation, and you should stop taking Advil to prevent potential reflux and fluid buildup.    -CHRONIC LOW BACK PAIN WITH RADICULOPATHY: Chronic low back pain with radiculopathy involves nerve irritation in the lower back, causing pain and tingling down the leg. We will increase your pregabalin dose to 75 mg twice a day, with an additional dose in the evening. Consider taking pregabalin after work to avoid daytime wooziness.    -ALLERGIC RHINITIS: Allergic rhinitis is inflammation of the nasal passages due to allergens, causing congestion and sneezing. We will switch from loratadine to cetirizine (Zyrtec) 10 mg twice a day, starting with once daily for 7-10 days to assess drowsiness. Use saline spray followed by Flonase twice a day, two sprays each time. Ensure you stay  adequately hydrated and avoid excessive use of Advil to prevent reflux and fluid buildup.    -VITAMIN B12 DEFICIENCY: Vitamin B12 deficiency means your body has low levels of B12, which is important for nerve and blood cell health. You will receive a B12 injection today. We will consider sublingual B12 tablets or liquid for home use and evaluate the cost-effectiveness of prescription versus over-the-counter options.    INSTRUCTIONS:  Follow up with vestibular rehabilitation as referred. If vestibular rehab is not effective, we may consider cervical rehabilitation or physical therapy. Continue with the increased pregabalin dose and monitor for any side effects. Start the Medrol DosePak as prescribed. Switch from loratadine to cetirizine (Zyrtec) and use saline spray followed by Flonase as directed. Stay hydrated and avoid excessive use of Advil. Receive your B12 injection today and consider sublingual B12 for home use. Evaluate the cost-effectiveness of prescription versus over-the-counter B12 options.

## 2025-08-05 ENCOUNTER — APPOINTMENT (OUTPATIENT)
Dept: PHARMACY | Facility: HOSPITAL | Age: 45
End: 2025-08-05
Payer: COMMERCIAL

## 2025-08-05 DIAGNOSIS — E66.812 CLASS 2 SEVERE OBESITY DUE TO EXCESS CALORIES WITH SERIOUS COMORBIDITY AND BODY MASS INDEX (BMI) OF 39.0 TO 39.9 IN ADULT: ICD-10-CM

## 2025-08-05 DIAGNOSIS — E66.811 CLASS 1 OBESITY DUE TO EXCESS CALORIES WITH SERIOUS COMORBIDITY AND BODY MASS INDEX (BMI) OF 34.0 TO 34.9 IN ADULT: Primary | ICD-10-CM

## 2025-08-05 DIAGNOSIS — E66.01 CLASS 2 SEVERE OBESITY DUE TO EXCESS CALORIES WITH SERIOUS COMORBIDITY AND BODY MASS INDEX (BMI) OF 39.0 TO 39.9 IN ADULT: ICD-10-CM

## 2025-08-05 DIAGNOSIS — E66.09 CLASS 1 OBESITY DUE TO EXCESS CALORIES WITH SERIOUS COMORBIDITY AND BODY MASS INDEX (BMI) OF 34.0 TO 34.9 IN ADULT: Primary | ICD-10-CM

## 2025-08-05 NOTE — PROGRESS NOTES
Clinical Pharmacy Appointment    Patient ID: Deja Hazel is a 45 y.o. female who presents for Weight Loss.    Pt is here for Follow Up appointment.     Referring Provider: Jamila Escobar MD  PCP: Jamila Escobar MD   Last visit with PCP: 7/31/2025  Next visit with PCP:11/4/2025    Subjective   HPI  Weight Loss  Starting Weight: 243 lbs   Starting BMI: 40.44 kg/m²     Current Weight: 204 lbs   Current BMI: 34.08 kg/m²     Comorbid Conditions:  HTN: No  Cholesterol: No    The 10-year ASCVD risk score (Aureliano MACHUCA, et al., 2019) is: 0.9%    Values used to calculate the score:      Age: 45 years      Clincally relevant sex: Female      Is Non- : No      Diabetic: No      Tobacco smoker: No      Systolic Blood Pressure: 112 mmHg      Is BP treated: Yes      HDL Cholesterol: 43 mg/dL      Total Cholesterol: 154 mg/dL     Drug Interactions  No relevant drug interactions were noted.    Medication System Management  Patient's preferred pharmacy:   Adherence/Organization: None   Affordability/Accessibility: None     Patient Assistance Screening (VAF)  - Patient was approved on 12/17/2024 for Zepbound     Objective   Allergies   Allergen Reactions    Adhesive Tape-Silicones Itching    Augmentin [Amoxicillin-Pot Clavulanate] Unknown    Covid-19 Vaccine, Mrna, [id number], Lnp-S (Pfizer) Unknown    Penicillins Unknown    Salicylates Other    Latex Hives, Itching and Rash    Prednisone Diarrhea, Hives, Itching and Rash     Other reaction(s): GI Upset     Social History     Social History Narrative    Not on file      Medication Review  Current Outpatient Medications   Medication Instructions    albuterol (Ventolin HFA) 90 mcg/actuation inhaler Inhale 1-2 puffs every 4 to 6 hours as needed for wheezing.    atenolol (TENORMIN) 25 mg, oral, Daily    blood-glucose sensor (FreeStyle Ernesto 3 Plus Sensor) device Use as directed.  Change every 15 days    celecoxib (CELEBREX) 200 mg, oral, Daily    cetirizine  "(ZYRTEC) 10 mg, oral, Every 12 hours    fluocinonide (Lidex) 0.05 % external solution Apply to affected areas of active dermatitis in the scalp twice daily as needed. Use less than 14 days per month.    fluticasone (Flonase Allergy Relief) 50 mcg/actuation nasal spray 1 spray, Each Nostril, 2 times daily    ketoconazole (NIZOral) 2 % cream Apply to affected areas twice daily when active. Once controlled, apply once daily for maintenance.    levonorgestrel (Mirena) 21 mcg/24 hours (8 yrs) 52 mg IUD by intrauterine route.    montelukast (SINGULAIR) 10 mg, oral, Daily    pregabalin (LYRICA) 75 mg, oral, 2 times daily    ruxolitinib (Opzelura) 1.5 % cream APPLY ONE (1) APPLICATION TOPICALLY TWICE DAILY TO ACTIVE AREAS OF ECZEMA FOR UP TO 8 WEEKS AT A TIME.    tirzepatide (weight loss) (ZEPBOUND) 15 mg, subcutaneous, Every 7 days    urea (Carmol) 40 % cream Apply to affected areas of thickened skin once daily as tolerated.      Vitals  BP Readings from Last 2 Encounters:   07/31/25 112/67   04/21/25 112/68     BMI Readings from Last 1 Encounters:   07/31/25 34.08 kg/m²      Labs  A1C  Lab Results   Component Value Date    HGBA1C 5.0 04/08/2025    HGBA1C 5.0 10/14/2024    HGBA1C 5.5 07/14/2023     BMP  Lab Results   Component Value Date    CALCIUM 9.8 06/30/2025     06/30/2025    K 4.4 06/30/2025    CO2 25 06/30/2025     06/30/2025    BUN 12 06/30/2025    CREATININE 0.55 06/30/2025    EGFR 114 06/30/2025     LFTs  Lab Results   Component Value Date    ALT 26 06/30/2025    AST 14 06/30/2025    ALKPHOS 66 06/30/2025    BILITOT 0.3 06/30/2025     FLP  Lab Results   Component Value Date    TRIG 59 04/08/2025    CHOL 154 04/08/2025    LDLF 84 11/07/2022    LDLCALC 97 04/08/2025    HDL 43 (L) 04/08/2025     Urine Microalbumin  No results found for: \"MICROALBCREA\"  Weight Management  Wt Readings from Last 3 Encounters:   07/31/25 92.9 kg (204 lb 12.8 oz)   04/21/25 97.5 kg (215 lb)   04/05/25 99.3 kg (219 lb)    "   There is no height or weight on file to calculate BMI.     Assessment/Plan   Problem List Items Addressed This Visit       Class 1 obesity due to excess calories with serious comorbidity and body mass index (BMI) of 34.0 to 34.9 in adult - Primary    Patient was referred for initiation of a GLP-1 agonist for weight loss.  Since last visit patient started the increased dose of Zepbound and reports no side effects.  The patient has lost about 40 lbs total so far.   Patient has noticed that she does not have as much of a decrease in appetite at this point.  Will continue on maximum dose as patient is tolerating and still losing weight.      PLAN:   - Continue Zepbound 15 mg once weekly injection.  Prescription sent to [facility] pharmacy.            Relevant Medications    tirzepatide, weight loss, (Zepbound) 15 mg/0.5 mL injection    Other Relevant Orders    Referral to Clinical Pharmacy     Other Visit Diagnoses         Class 2 severe obesity due to excess calories with serious comorbidity and body mass index (BMI) of 39.0 to 39.9 in adult        Relevant Medications    tirzepatide, weight loss, (Zepbound) 15 mg/0.5 mL injection          Clinical Pharmacist follow-up: 3 months , Telehealth visit    Continue all meds under the continuation of care with the referring provider and clinical pharmacy team.    Thank you,  Carolina Augustin, PharmD   Clinical Pharmacist  [phone]    Verbal consent to manage patient's drug therapy was obtained from the patient. They were informed they may decline to participate or withdraw from participation in pharmacy services at any time.

## 2025-08-05 NOTE — ASSESSMENT & PLAN NOTE
Patient was referred for initiation of a GLP-1 agonist for weight loss.  Since last visit patient started the increased dose of Zepbound and reports no side effects.  The patient has lost about 40 lbs total so far.   Patient has noticed that she does not have as much of a decrease in appetite at this point.  Will continue on maximum dose as patient is tolerating and still losing weight.      PLAN:   - Continue Zepbound 15 mg once weekly injection.  Prescription sent to [facility] pharmacy.

## 2025-08-20 ENCOUNTER — PHARMACY VISIT (OUTPATIENT)
Dept: PHARMACY | Facility: CLINIC | Age: 45
End: 2025-08-20
Payer: MEDICARE

## 2025-08-20 PROCEDURE — RXMED WILLOW AMBULATORY MEDICATION CHARGE

## 2025-09-03 ENCOUNTER — CLINICAL SUPPORT (OUTPATIENT)
Dept: PHYSICAL THERAPY | Facility: CLINIC | Age: 45
End: 2025-09-03
Payer: COMMERCIAL

## 2025-09-03 DIAGNOSIS — R42 VERTIGO: Primary | ICD-10-CM

## 2025-09-03 PROCEDURE — 97162 PT EVAL MOD COMPLEX 30 MIN: CPT | Mod: GP | Performed by: PHYSICAL THERAPIST

## 2025-09-03 PROCEDURE — 97110 THERAPEUTIC EXERCISES: CPT | Mod: GP | Performed by: PHYSICAL THERAPIST

## 2025-11-04 ENCOUNTER — APPOINTMENT (OUTPATIENT)
Dept: PRIMARY CARE | Facility: CLINIC | Age: 45
End: 2025-11-04
Payer: COMMERCIAL

## 2025-11-11 ENCOUNTER — APPOINTMENT (OUTPATIENT)
Dept: PHARMACY | Facility: HOSPITAL | Age: 45
End: 2025-11-11
Payer: COMMERCIAL

## 2026-01-09 ENCOUNTER — APPOINTMENT (OUTPATIENT)
Dept: DERMATOLOGY | Facility: CLINIC | Age: 46
End: 2026-01-09
Payer: COMMERCIAL